# Patient Record
Sex: MALE | Race: WHITE
[De-identification: names, ages, dates, MRNs, and addresses within clinical notes are randomized per-mention and may not be internally consistent; named-entity substitution may affect disease eponyms.]

---

## 2018-10-12 ENCOUNTER — HOSPITAL ENCOUNTER (EMERGENCY)
Dept: HOSPITAL 97 - ER | Age: 83
Discharge: HOME | End: 2018-10-12
Payer: COMMERCIAL

## 2018-10-12 VITALS — OXYGEN SATURATION: 94 % | DIASTOLIC BLOOD PRESSURE: 80 MMHG | SYSTOLIC BLOOD PRESSURE: 118 MMHG

## 2018-10-12 VITALS — TEMPERATURE: 99.1 F

## 2018-10-12 DIAGNOSIS — Z79.82: ICD-10-CM

## 2018-10-12 DIAGNOSIS — I10: ICD-10-CM

## 2018-10-12 DIAGNOSIS — E78.5: ICD-10-CM

## 2018-10-12 DIAGNOSIS — Z88.8: ICD-10-CM

## 2018-10-12 DIAGNOSIS — Z85.46: ICD-10-CM

## 2018-10-12 DIAGNOSIS — I25.10: ICD-10-CM

## 2018-10-12 DIAGNOSIS — R05: Primary | ICD-10-CM

## 2018-10-12 LAB
ALBUMIN SERPL BCP-MCNC: 3.3 G/DL (ref 3.4–5)
ALP SERPL-CCNC: 55 U/L (ref 45–117)
ALT SERPL W P-5'-P-CCNC: 25 U/L (ref 12–78)
AST SERPL W P-5'-P-CCNC: 24 U/L (ref 15–37)
BUN BLD-MCNC: 26 MG/DL (ref 7–18)
GLUCOSE SERPLBLD-MCNC: 130 MG/DL (ref 74–106)
HCT VFR BLD CALC: 44.8 % (ref 39.6–49)
LYMPHOCYTES # SPEC AUTO: 0.7 K/UL (ref 0.7–4.9)
MCH RBC QN AUTO: 33.8 PG (ref 27–35)
MCV RBC: 96.4 FL (ref 80–100)
PMV BLD: 8.7 FL (ref 7.6–11.3)
POTASSIUM SERPL-SCNC: 3.9 MMOL/L (ref 3.5–5.1)
RBC # BLD: 4.65 M/UL (ref 4.33–5.43)
UA COMPLETE W REFLEX CULTURE PNL UR: (no result)

## 2018-10-12 PROCEDURE — 80048 BASIC METABOLIC PNL TOTAL CA: CPT

## 2018-10-12 PROCEDURE — 93005 ELECTROCARDIOGRAM TRACING: CPT

## 2018-10-12 PROCEDURE — 87040 BLOOD CULTURE FOR BACTERIA: CPT

## 2018-10-12 PROCEDURE — 80076 HEPATIC FUNCTION PANEL: CPT

## 2018-10-12 PROCEDURE — 87804 INFLUENZA ASSAY W/OPTIC: CPT

## 2018-10-12 PROCEDURE — 85025 COMPLETE CBC W/AUTO DIFF WBC: CPT

## 2018-10-12 PROCEDURE — 81003 URINALYSIS AUTO W/O SCOPE: CPT

## 2018-10-12 PROCEDURE — 71045 X-RAY EXAM CHEST 1 VIEW: CPT

## 2018-10-12 PROCEDURE — 84145 PROCALCITONIN (PCT): CPT

## 2018-10-12 PROCEDURE — 81015 MICROSCOPIC EXAM OF URINE: CPT

## 2018-10-12 PROCEDURE — 36415 COLL VENOUS BLD VENIPUNCTURE: CPT

## 2018-10-12 PROCEDURE — 99284 EMERGENCY DEPT VISIT MOD MDM: CPT

## 2018-10-12 PROCEDURE — 96365 THER/PROPH/DIAG IV INF INIT: CPT

## 2018-10-12 NOTE — EKG
Test Date:    2018-10-12               Test Time:    01:17:06

Technician:   CHANDRA                                    

                                                     

MEASUREMENT RESULTS:                                       

Intervals:                                           

Rate:         70                                     

AR:           208                                    

QRSD:         98                                     

QT:           378                                    

QTc:          408                                    

Axis:                                                

P:            65                                     

AR:           208                                    

QRS:          -64                                    

T:            64                                     

                                                     

INTERPRETIVE STATEMENTS:                                       

                                                     

Sinus rhythm with marked sinus arrhythmia

Left axis deviation

Abnormal ECG

Compared to ECG 03/08/2017 14:21:18

Sinus bradycardia no longer present



Electronically Signed On 10-12-18 08:11:01 CDT by Victor Manuel Mancia

## 2018-10-12 NOTE — EDPHYS
Physician Documentation                                                                           

 [facility]                                                                

Name: Ap Orr                                                                                   

Age: 83 yrs                                                                                       

Sex: Male                                                                                         

: 1934                                                                                   

MRN: [id number]                                                                                   

Arrival Date: 10/12/2018                                                                          

Time: 00:50                                                                                       

Account#: [id number]                                                                            

Bed 14                                                                                            

Private MD:                                                                                       

ED Physician Preston Martinez                                                                      

HPI:                                                                                              

10/12                                                                                             

01:01 This 83 yrs old  Male presents to ER via EMS with complaints of chills.        cp  

01:01 The patient reports fever, not measured (subjective). Onset: The symptoms/episode       cp  

      began/occurred tonight. Associated signs and symptoms: Pertinent positives: chills,         

      cough, that is dry. Severity of symptoms: in the emergency department the symptoms are      

      unchanged.                                                                                  

                                                                                                  

Historical:                                                                                       

- Allergies:                                                                                      

00:53 ambien (agitation);                                                                     jb4 

- Home Meds:                                                                                      

00:53 aspirin 81 mg Oral TbEC [Active]; Lipitor 40 mg Oral tab [Active]; Lipitor Oral         jb4 

      [Active]; Sotalol 40 mg Oral daily [Active]; Zyrtec 10 mg Oral tab once daily [Active];     

- PMHx:                                                                                           

00:53 CAD; Hyperlipidemia; Hypertension; Prostate Cancer;                                     jb4 

- PSHx:                                                                                           

00:53 Appendectomy; Cholecystectomy; Heart Surgery;                                           jb4 

                                                                                                  

- Immunization history:: Adult Immunizations up to date, Flu vaccine is up to date.               

- Social history:: Smoking status: Patient/guardian denies using tobacco.                         

- Ebola Screening: : No symptoms or risks identified at this time.                                

                                                                                                  

                                                                                                  

ROS:                                                                                              

01:02 Eyes: Negative for injury, pain, redness, and discharge.                                cp  

01:02 Constitutional: Positive for chills, Negative for fever, poor PO intake.                    

01:02 ENT: Negative for drainage from ear(s), ear pain, rhinorrhea, sore throat, difficulty       

      swallowing, difficulty handling secretions.                                                 

01:02 Cardiovascular: Negative for chest pain, edema, palpitations.                               

01:02 Respiratory: Positive for cough, Negative for shortness of breath, wheezing.                

01:02 Abdomen/GI: Negative for abdominal pain, nausea, vomiting, and diarrhea.                    

01:02 Back: Negative for pain at rest, pain with movement, radiated pain.                         

01:02 Skin: Negative for rash.                                                                    

01:02 Neuro: Negative for altered mental status, headache, weakness.                              

01:02 All other systems are negative.                                                             

                                                                                                  

Exam:                                                                                             

01:05 Constitutional: The patient appears in no acute distress, alert, awake,                 cp  

      non-diaphoretic, non-toxic, well developed, well nourished.                                 

01:05 Head/Face:  Normocephalic, atraumatic.                                                  cp  

01:05 Eyes: Periorbital structures: appear normal, Pupils: equal, round, and reactive to          

      light and accomodation, Extraocular movements: intact throughout, Conjunctiva: normal,      

      no exudate, no injection, Sclera: no appreciated abnormality, Lids and lashes: appear       

      normal, bilaterally.                                                                        

01:05 ENT: External ear(s): are unremarkable, Ear canal(s): are normal, clear, TM's: bulging,     

      is not appreciated, bilaterally, dullness, bilaterally, erythema, is not appreciated,       

      bilaterally, Nose: is normal, Mouth: Lips: dry, Oral mucosa: moist, Posterior pharynx:      

      is normal, airway is patent, no erythema, no exudate, Voice: is normal.                     

01:05 Neck: ROM/movement: is normal, is supple, without pain, no range of motions                 

      limitations, no meningismus, no nuchal rigidity.                                            

01:05 Chest/axilla: Inspection: normal, Palpation: is normal, no crepitus, no tenderness.         

01:05 Cardiovascular: Rate: normal, Rhythm: regular, Pulses: Pulses are 2+ in right radial        

      artery and left radial artery. Edema: is not appreciated, JVD: is not appreciated.          

01:05 Respiratory: the patient does not display signs of respiratory distress,  Respirations:     

      normal, no use of accessory muscles, no retractions, no splinting, no tachypnea,            

      labored breathing, is not present, Breath sounds: are clear throughout, no decreased        

      breath sounds, no stridor, no wheezing.                                                     

01:05 Abdomen/GI: Inspection: abdomen appears normal, Bowel sounds: active, all quadrants,        

      Palpation: abdomen is soft and non-tender, in all quadrants.                                

01:05 Back: pain, is absent, ROM is normal.                                                       

01:05 Skin: cellulitis, is not appreciated, no rash present.                                      

01:05 Neuro: Orientation: to person, place \T\ time. Mentation: lucid, able to follow commands,   

      Cerebellar function: Romberg testing is negative, normal finger to nose testing, Motor:     

      moves all fours, strength is normal, Sensation: no obvious gross deficits.                  

01:18 ECG was reviewed by the Attending Physician.                                              

                                                                                                  

Vital Signs:                                                                                      

00:53  / 100; Pulse 81; Resp 18; Temp 99.1; Pulse Ox 97% ; Weight 101.15 kg; Height 5   jb4 

      ft. 11 in. (180.34 cm); Pain 0/10;                                                          

01:30  / 76; Pulse 86; Resp 18; Pulse Ox 94% on R/A;                                    jb4 

02:35  / 72; Pulse 85; Resp 16; Pulse Ox 96% ;                                          ds4 

03:00  / 80; Pulse 81; Resp 18; Pulse Ox 94% on R/A;                                    jb4 

00:53 Body Mass Index 31.10 (101.15 kg, 180.34 cm)                                            jb4 

                                                                                                  

MDM:                                                                                              

00:53 Patient medically screened.                                                             cp  

01:30 Differential diagnosis: viral Infection, bacterial infection, URI, bronchitis,          cp  

      pneumonia UTI, meningitis, sepsis.                                                          

02:55 Data reviewed: vital signs, nurses notes, lab test result(s), radiologic studies, plain cp  

      films.                                                                                      

02:55 Test interpretation: by ED physician or midlevel provider: plain radiologic studies.    cp  

      Counseling: I had a detailed discussion with the patient and/or guardian regarding: the     

      historical points, exam findings, and any diagnostic results supporting the                 

      discharge/admit diagnosis, lab results, radiology results, to return to the emergency       

      department if symptoms worsen or persist or if there are any questions or concerns that     

      arise at home. Response to treatment: the patient's symptoms have markedly improved         

      after treatment, VSS. Patient reports he is feeling better, and as a result, I will         

      discharge patient.                                                                          

                                                                                                  

10/12                                                                                             

01:06 Order name: Influenza Screen (a \T\ B); Complete Time: 01:53                              

10/12                                                                                             

01:53 Interpretation: Reviewed.                                                                 

10/12                                                                                             

01:06 Order name: Blood Culture Adult (2)                                                       

10/12                                                                                             

01:06 Order name: Procalcitonin; Complete Time: 02:49                                           

10/12                                                                                             

01:06 Order name: CBC with Diff; Complete Time: 02:04                                           

10/12                                                                                             

02:05 Interpretation: ; DIMA% 80.6; LYM% 11.4.                                            

10/12                                                                                             

01:06 Order name: BMP; Complete Time: 02:16                                                     

10/12                                                                                             

02:16 Interpretation: Normal except: ; GLUC 130; BUN 26; CRE 2.00; GFR 32; CA 8.2.        

10/12                                                                                             

01:06 Order name: LFT's; Complete Time: 02:16                                                   

10/12                                                                                             

02:17 Interpretation: Normal except: BILIT 2.6; BILID 0.4; ALB 3.3; A/G 0.9.                    

10/12                                                                                             

01:06 Order name: IV; Complete Time: 01:42                                                      

10/12                                                                                             

01:06 Order name: XRAY Chest (1 view)                                                           

10/12                                                                                             

01:06 Order name: Urine Dipstick-Ancillary (obtain specimen); Complete Time: 02:16              

10/12                                                                                             

01:06 Order name: Urine Microscopic Only                                                        

10/12                                                                                             

01:07 Order name: EKG; Complete Time: 01:07                                                     

10/12                                                                                             

02:17 Order name: Urine Dipstick--Ancillary (enter results)                                   [facility] 

10/12                                                                                             

01:07 Order name: EKG - Nurse/Tech; Complete Time: 01:42                                      cp  

                                                                                                  

EC:18 Rate is 70 beats/min. Rhythm is regular. UT interval is prolonged at 208 msec. QRS      cp  

      interval is normal. QT interval is normal. Interpreted by me. Reviewed by me.               

                                                                                                  

Administered Medications:                                                                         

02:20 Drug: NS 0.9% 250 ml Route: IV; Rate: bolus; Site: right antecubital;                   jb4 

02:45 Follow up: Response: No adverse reaction; IV Status: Completed infusion                 jb4 

03:13 Follow up: IV Status: Completed infusion                                                ak1 

02:45 Drug: NS 0.9% 250 ml Route: IV; Rate: bolus; Site: right antecubital;                   jb4 

03:10 Follow up: Response: No adverse reaction; IV Status: Completed infusion                 jb4 

                                                                                                  

                                                                                                  

Disposition:                                                                                      

09:40 Co-signature as Attending Physician, Preston Martinez MD I agree with the assessment and  [facility] 

      plan of care.                                                                               

                                                                                                  

Disposition:                                                                                      

10/12/18 02:56 Discharged to Home. Impression: Chills (without fever), Cough.                     

- Condition is Stable.                                                                            

- Discharge Instructions: Cool Mist Vaporizer, Cough, Adult.                                      

                                                                                                  

- Medication Reconciliation Form, Thank You Letter, Antibiotic Education, Prescription            

  Opioid Use form.                                                                                

- Follow up: Private Physician; When: 2 - 3 days; Reason: Recheck today's complaints.             

- Problem is new.                                                                                 

- Symptoms have improved.                                                                         

                                                                                                  

                                                                                                  

                                                                                                  

Signatures:                                                                                       

Dispatcher [employer]                           Preston Arguelles MD MD cha Page, Corey, PA                         PA   cp                                                   

Ike Cardenas, RN                       RN   jb4                                                  

Medina Manning                           RN   ak1                                                  

                                                                                                  

Corrections: (The following items were deleted from the chart)                                    

02:16 02:16 Normal except: ; GLUC 130; BUN 26; CRE 2.00; GFR 32. cp                     cp  

03:22 02:56 10/12/2018 02:56 Discharged to Home. Impression: Chills (without fever); Cough.   jb4 

      Condition is Stable. Forms are Medication Reconciliation Form, Thank You Letter,            

      Antibiotic Education, Prescription Opioid Use. Follow up: Private Physician; When: 2 -      

      3 days; Reason: Recheck today's complaints. Problem is new. Symptoms have improved. cp      

                                                                                                  

**************************************************************************************************

## 2018-10-12 NOTE — RAD REPORT
EXAM DESCRIPTION:  RAD - Chest Single View - 10/12/2018 1:27 am

 

CLINICAL HISTORY:  Cough, fever, chills

 

COMPARISON:  March 2017

 

TECHNIQUE:  AP portable chest image was obtained 0120 hours .

 

FINDINGS:  No focal mass, consolidation or failure finding. Patient has a mild baseline prominence of
 the interstitial markings. Slight nodularity at the left hilum is stable from March 2017. Heart and 
vasculature are normal. No measurable pleural effusion and no pneumothorax. No acute bony abnormality
 seen. No acute aortic findings suspected.

 

IMPRESSION:  No acute cardiopulmonary process.

 

Chest findings are similar to comparison.

## 2018-10-12 NOTE — XMS REPORT
Clinical Summary

 Created on:2018



Patient:Ap Orr

Sex:Male

:1934

External Reference #:[id number]





Demographics







 Address  [address] [place] [address]

 

 Home Phone  [phone]

 

 Preferred Language  English

 

 Marital Status  Unknown

 

 Worship Affiliation  Unknown

 

 Race  White

 

 Ethnic Group  Not  or 









Author







 Organization  [facility]

 

 Address  [address] [place] [address]

 

 Phone  [phone]









Support







 Name  Relationship  Address  Phone

 

 Unavailable  Unavailable  [address]  [phone]



     [place] [address]  

 

 Unavailable  Unavailable  Unavailable  [phone]









Care Team Providers







 Name  Role  Phone

 

 Unavailable  Primary Care Provider  Unavailable









Allergies







 Active Allergy  Reactions  Severity  Noted Date  Comments

 

 Zolpidem    High  2017  







Current Medications







 Prescription  Sig.  Disp.  Refills  Start Date  End Date  Status

 

 atorvastatin (LIPITOR)  Take 40 mg by          Active



 40 MG tablet  mouth daily.          

 

 sotalol AF (BETAPACE  Take 40 mg by          Active



 AF) 80 MG tablet  mouth daily.          

 

 cetirizine (ZYRTEC) 10  Take 10 mg by          Active



 MG tablet  mouth daily.          

 

 ascorbic acid, vitamin  Take 500 mg by          Active



 C, (ASCORBIC ACID WITH  mouth daily.          



 WLIDA HIPS) 500 MG            



 tablet            

 

 rivaroxaban (XARELTO)  Take 1 tablet (20  30 tablet  2  03/10/2017    Active



 20 mg Tab tablet  mg total) by          



   mouth daily with          



   dinner.          







Active Problems







 Problem  Noted Date

 

 Stroke (HCC)  2017







Social History







 Tobacco Use  Types  Packs/Day  Years Used  Date

 

 Never Smoker        









 Sex Assigned at Birth  Date Recorded

 

 Not on file  







Last Filed Vital Signs

Not on file



Plan of Treatment

Not on file



Results

Not on fileafter 10/11/2017

## 2018-10-12 NOTE — ER
Nurse's Notes                                                                                     

 [facility]                                                                

Name: Ap Orr                                                                                   

Age: 83 yrs                                                                                       

Sex: Male                                                                                         

: 1934                                                                                   

MRN: [id number]                                                                                   

Arrival Date: 10/12/2018                                                                          

Time: 00:50                                                                                       

Account#: [id number]                                                                            

Bed 14                                                                                            

Private MD:                                                                                       

Diagnosis: Chills (without fever);Cough                                                           

                                                                                                  

Presentation:                                                                                     

10/12                                                                                             

00:50 Presenting complaint: EMS states: Pt was found sitting in his home complaining of fever jb4 

      and chills. Transition of care: patient was not received from another setting of care.      

      Onset of symptoms was 2018. Risk Assessment: Do you want to hurt yourself       

      or someone else? Patient reports no desire to harm self or others. Initial Sepsis           

      Screen: Does the patient meet any 2 criteria? No. Patient's initial sepsis screen is        

      negative. Does the patient have a suspected source of infection? No. Patient's initial      

      sepsis screen is negative. Care prior to arrival: None.                                     

00:50 Method Of Arrival: EMS: [facility]                                                jb4 

00:50 Acuity: ELIZABETH 4                                                                           jb4 

                                                                                                  

Triage Assessment:                                                                                

00:53 General: Appears in no apparent distress. uncomfortable, Behavior is calm, cooperative, jb4 

      appropriate for age. Pain: Denies pain. EENT: No signs and/or symptoms were reported        

      regarding the EENT system. Neuro: Level of Consciousness is awake, alert, obeys             

      commands, Oriented to person, place, time, situation. Cardiovascular: Heart tones S1 S2     

      present Patient's skin is warm and dry. Respiratory: Airway is patent Respiratory           

      effort is even, unlabored, Respiratory pattern is regular, symmetrical, Breath sounds       

      are clear bilaterally. GI: Abdomen is round non-distended, Bowel sounds present X 4         

      quads. Abd is soft and non tender X 4 quads. : No signs and/or symptoms were reported     

      regarding the genitourinary system. Derm: Skin is intact, Skin is pink, warm \T\ dry.       

      Musculoskeletal: Circulation, motion, and sensation intact.                                 

                                                                                                  

Historical:                                                                                       

- Allergies:                                                                                      

00:53 ambien (agitation);                                                                     jb4 

- Home Meds:                                                                                      

00:53 aspirin 81 mg Oral TbEC [Active]; Lipitor 40 mg Oral tab [Active]; Lipitor Oral         jb4 

      [Active]; Sotalol 40 mg Oral daily [Active]; Zyrtec 10 mg Oral tab once daily [Active];     

- PMHx:                                                                                           

00:53 CAD; Hyperlipidemia; Hypertension; Prostate Cancer;                                     jb4 

- PSHx:                                                                                           

00:53 Appendectomy; Cholecystectomy; Heart Surgery;                                           jb4 

                                                                                                  

- Immunization history:: Adult Immunizations up to date, Flu vaccine is up to date.               

- Social history:: Smoking status: Patient/guardian denies using tobacco.                         

- Ebola Screening: : No symptoms or risks identified at this time.                                

                                                                                                  

                                                                                                  

Screenin:58 Abuse screen: Denies threats or abuse. Nutritional screening: No deficits noted.        jb4 

      Tuberculosis screening: No symptoms or risk factors identified. Fall Risk None              

      identified.                                                                                 

                                                                                                  

Assessment:                                                                                       

00:58 General: see triage assessment .                                                        jb4 

02:00 Reassessment: Patient appears in no apparent distress at this time. Patient and/or      jb4 

      family updated on plan of care and expected duration. Pain level reassessed. Patient is     

      alert, oriented x 3, equal unlabored respirations, skin warm/dry/pink.                      

03:20 Reassessment: Patient appears in no apparent distress at this time. Patient and/or      jb4 

      family updated on plan of care and expected duration. Pain level reassessed. Patient is     

      alert, oriented x 3, equal unlabored respirations, skin warm/dry/pink. discussed D/c,       

      F/u with pt, Denies questions or concerns.                                                  

                                                                                                  

Vital Signs:                                                                                      

00:53  / 100; Pulse 81; Resp 18; Temp 99.1; Pulse Ox 97% ; Weight 101.15 kg; Height 5   jb4 

      ft. 11 in. (180.34 cm); Pain 0/10;                                                          

01:30  / 76; Pulse 86; Resp 18; Pulse Ox 94% on R/A;                                    jb4 

02:35  / 72; Pulse 85; Resp 16; Pulse Ox 96% ;                                          ds4 

03:00  / 80; Pulse 81; Resp 18; Pulse Ox 94% on R/A;                                    jb4 

00:53 Body Mass Index 31.10 (101.15 kg, 180.34 cm)                                            jb4 

                                                                                                  

ED Course:                                                                                        

00:50 Patient arrived in ED.                                                                  jb4 

00:51 Triage completed.                                                                       jb4 

00:53 Preston Gonzalez PA is PHCP.                                                                cp  

00:53 Preston Martinez MD is Attending Physician.                                             cp  

00:53 Arm band placed on left wrist.                                                          jb4 

00:58 Ike Cardenas, RN is Primary Nurse.                                                     jb4 

00:58 Patient has correct armband on for positive identification. Placed in gown. Bed in low  jb4 

      position. Call light in reach. Side rails up X 1. Pulse ox on. NIBP on.                     

01:23 X-ray completed. Portable x-ray completed in exam room. Patient tolerated procedure     ag1 

      well.                                                                                       

01:28 XRAY Chest (1 view) In Process Unspecified.                                             EDMS

01:49 Influenza Screen (a \T\ B) Sent.                                                          ds4

01:49 LFT's Sent.                                                                             ds4 

01:49 Blood Culture Adult (2) Sent.                                                           ds4 

01:49 BMP Sent.                                                                               ds4 

01:49 Procalcitonin Sent.                                                                     ds4 

01:49 CBC with Diff Sent.                                                                     ds4 

01:49 Inserted saline lock: 20 gauge in right antecubital area, using aseptic technique.      ds4 

      Blood collected.                                                                            

02:39 Blood Culture Adult (2) Sent.                                                           ds4 

02:39 Procalcitonin Sent.                                                                     ds4 

03:20 IV discontinued, intact, bleeding controlled.                                           jb4 

03:20 No provider procedures requiring assistance completed.                                  jb4 

                                                                                                  

Administered Medications:                                                                         

02:20 Drug: NS 0.9% 250 ml Route: IV; Rate: bolus; Site: right antecubital;                   jb4 

02:45 Follow up: Response: No adverse reaction; IV Status: Completed infusion                 jb4 

03:13 Follow up: IV Status: Completed infusion                                                ak1 

02:45 Drug: NS 0.9% 250 ml Route: IV; Rate: bolus; Site: right antecubital;                   jb4 

03:10 Follow up: Response: No adverse reaction; IV Status: Completed infusion                 jb4 

                                                                                                  

                                                                                                  

Outcome:                                                                                          

02:56 Discharge ordered by MD.                                                                memo  

03:20 Discharged to home ambulatory.                                                          jb4 

03:20 Condition: stable                                                                           

03:20 Discharge instructions given to patient, Instructed on discharge instructions, follow       

      up and referral plans. Demonstrated understanding of instructions, follow-up care.          

03:22 Patient left the ED.                                                                    jb4 

                                                                                                  

Signatures:                                                                                       

Dispatcher [employer]                           Gigi Antoine                             ds4                                                  

Medina Manning, RN                       RN   ak1                                                  

Lisseth Interiano1                                                  

Preston Gonzalez PA PA cp Bryson, James, RN                       RN   jb4                                                  

                                                                                                  

**************************************************************************************************

## 2018-10-12 NOTE — XMS REPORT
Patient Summary Document

 Created on:2018



Patient:MARIANNE GRULLON

Sex:Male

:1934

External Reference #:[id number]





Demographics







 Address  [address] [place] [address]

 

 Home Phone  [phone]

 

 Preferred Language  Unknown

 

 Marital Status  Unknown

 

 Gnosticism Affiliation  Unknown

 

 Race  Unknown

 

 Additional Race(s)  Unavailable

 

 Ethnic Group  Unknown









Author







 Organization  [facility]

 

 Address  [address]



   [place] [address]

 

 Phone  [phone]









Care Team Providers







 Name  Role  Phone

 

 NATI WARD  Unavailable  Unavailable









Problems

This patient has no known problems.



Allergies, Adverse Reactions, Alerts

This patient has no known allergies or adverse reactions.



Medications

This patient has no known medications.



Results







 Test Description  Test Time  Test Comments  Text Results  Atomic Results  
Result Comments









 POCT-GLUCOSE METER  2017-03-10 17:06:00    









   Test Item  Value  Reference Range  Comments









 POC-GLUCOSE METER (BEAKER) (test  86 mg/dL    TESTED AT [facility] [address] 
[facility]



 pztn=[id number])      [facility] [address]



POCT-GLUCOSE METER2017-03-10 12:26:00





 Test Item  Value  Reference Range  Comments

 

 POC-GLUCOSE METER (BEAKER)  86 mg/dL    TESTED AT [address]



 (test wtsu=1530)      [facility] [address]



CBC W/PLT COUNT &amp; AUTO DIFFERENTIAL2017-03-10 08:00:00





 Test Item  Value  Reference Range  Comments

 

 WHITE BLOOD CELL COUNT (BEAKER) (test code=775)  9.4 K/ L  4.0-10.0  

 

 RED BLOOD CELL COUNT (BEAKER) (test code=761)  4.32 M/ L  4.20-5.80  

 

 HEMOGLOBIN (BEAKER) (test code=410)  15.1 GM/DL  13.0-16.8  

 

 HEMATOCRIT (BEAKER) (test code=411)  43.3 %  40.0-50.0  

 

 MEAN CORPUSCULAR VOLUME (BEAKER) (test code=753)  100.0 fL  82.0-98.0  

 

 MEAN CORPUSCULAR HEMOGLOBIN (BEAKER) (test  34.8 pg  27.0-33.0  



 code=751)      

 

 MEAN CORPUSCULAR HEMOGLOBIN CONC (BEAKER) (test  34.7 GM/DL  32.0-36.0  



 code=752)      

 

 RED CELL DISTRIBUTION WIDTH (BEAKER) (test  12.0 %  10.3-14.2  



 code=412)      

 

 PLATELET COUNT (BEAKER) (test code=756)  138 K/CU MM  150-430  

 

 MEAN PLATELET VOLUME (BEAKER) (test code=754)  8.3 fL  6.5-10.5  

 

 NUCLEATED RED BLOOD CELLS (BEAKER) (test  0 /100 WBC  0-0  



 code=413)      

 

 NEUTROPHILS RELATIVE PERCENT (BEAKER) (test  58 %    



 code=429)      

 

 LYMPHOCYTES RELATIVE PERCENT (BEAKER) (test  31 %    



 code=430)      

 

 MONOCYTES RELATIVE PERCENT (BEAKER) (test  8 %    



 code=431)      

 

 EOSINOPHILS RELATIVE PERCENT (BEAKER) (test  2 %    



 code=432)      

 

 BASOPHILS RELATIVE PERCENT (BEAKER) (test  1 %    



 code=437)      

 

 NEUTROPHILS ABSOLUTE COUNT (BEAKER) (test  5.44 K/ L  1.80-8.00  



 code=670)      

 

 LYMPHOCYTES ABSOLUTE COUNT (BEAKER) (test  2.94 K/ L  1.48-4.50  



 code=414)      

 

 MONOCYTES ABSOLUTE COUNT (BEAKER) (test  0.73 K/ L  0.00-1.30  



 code=415)      

 

 EOSINOPHILS ABSOLUTE COUNT (BEAKER) (test  0.22 K/ L  0.00-0.50  



 code=416)      

 

 BASOPHILS ABSOLUTE COUNT (BEAKER) (test  0.06 K/ L  0.00-0.20  



 code=417)      



0.00POCT-GLUCOSE [id number] 21:27:00





 Test Item  Value  Reference Range  Comments

 

 POC-GLUCOSE METER (BEAKER)  84 mg/dL    TESTED AT [address]



 (test ajff=0953)      [place] [address]



[id number] 14:57:00





 Test Item  Value  Reference Range  Comments

 

 RPR SCREEN (BEAKER) (test code=420)  Nonreactive  Nonreactive  



POCT-GLUCOSE [id number] 12:20:00





 Test Item  Value  Reference Range  Comments

 

 POC-GLUCOSE METER (BEAKER)  141 mg/dL    TESTED AT [address]



 (test zycg=3870)      [place] [address]



POCT-GLUCOSE [id number] 08:06:00





 Test Item  Value  Reference Range  Comments

 

 POC-GLUCOSE METER (BEAKER)  86 mg/dL    TESTED AT [address]



 (test ncur=1004)      [place] [address]



CBC W/PLT COUNT &amp; AUTO [id number] 05:34:00





 Test Item  Value  Reference Range  Comments

 

 WHITE BLOOD CELL COUNT (BEAKER) (test code=775)  6.7 K/ L  4.0-10.0  

 

 RED BLOOD CELL COUNT (BEAKER) (test code=761)  3.94 M/ L  4.20-5.80  

 

 HEMOGLOBIN (BEAKER) (test code=410)  14.0 GM/DL  13.0-16.8  

 

 HEMATOCRIT (BEAKER) (test code=411)  39.2 %  40.0-50.0  

 

 MEAN CORPUSCULAR VOLUME (BEAKER) (test code=753)  99.6 fL  82.0-98.0  

 

 MEAN CORPUSCULAR HEMOGLOBIN (BEAKER) (test  35.5 pg  27.0-33.0  



 code=751)      

 

 MEAN CORPUSCULAR HEMOGLOBIN CONC (BEAKER) (test  35.6 GM/DL  32.0-36.0  



 code=752)      

 

 RED CELL DISTRIBUTION WIDTH (BEAKER) (test  11.8 %  10.3-14.2  



 code=412)      

 

 PLATELET COUNT (BEAKER) (test code=756)  129 K/CU MM  150-430  

 

 MEAN PLATELET VOLUME (BEAKER) (test code=754)  7.9 fL  6.5-10.5  

 

 NUCLEATED RED BLOOD CELLS (BEAKER) (test  0 /100 WBC  0-0  



 code=413)      

 

 NEUTROPHILS RELATIVE PERCENT (BEAKER) (test  49 %    



 code=429)      

 

 LYMPHOCYTES RELATIVE PERCENT (BEAKER) (test  38 %    



 code=430)      

 

 MONOCYTES RELATIVE PERCENT (BEAKER) (test  8 %    



 code=431)      

 

 EOSINOPHILS RELATIVE PERCENT (BEAKER) (test  4 %    



 code=432)      

 

 BASOPHILS RELATIVE PERCENT (BEAKER) (test  1 %    



 code=437)      

 

 NEUTROPHILS ABSOLUTE COUNT (BEAKER) (test  3.24 K/ L  1.80-8.00  



 code=670)      

 

 LYMPHOCYTES ABSOLUTE COUNT (BEAKER) (test  2.55 K/ L  1.48-4.50  



 code=414)      

 

 MONOCYTES ABSOLUTE COUNT (BEAKER) (test  0.53 K/ L  0.00-1.30  



 code=415)      

 

 EOSINOPHILS ABSOLUTE COUNT (BEAKER) (test  0.27 K/ L  0.00-0.50  



 code=416)      

 

 BASOPHILS ABSOLUTE COUNT (BEAKER) (test  0.07 K/ L  0.00-0.20  



 code=417)      



0.00BASIC METABOLIC [id number] 05:17:00





 Test Item  Value  Reference Range  Comments

 

 SODIUM (BEAKER) (test  143 meq/L  136-145  



 kwag=526)      

 

 POTASSIUM (BEAKER) (test  3.6 meq/L  3.5-5.1  



 code=379)      

 

 CHLORIDE (BEAKER) (test  116 meq/L    



 code=382)      

 

 CO2 (BEAKER) (test  22 meq/L  22-29  



 code=355)      

 

 BLOOD UREA NITROGEN  20 mg/dL  7-21  



 (BEAKER) (test code=354)      

 

 CREATININE (BEAKER) (test  1.10 mg/dL  0.57-1.25  



 code=358)      

 

 GLUCOSE RANDOM (BEAKER)  88 mg/dL    



 (test code=652)      

 

 CALCIUM (BEAKER) (test  7.3 mg/dL  8.4-10.2  



 code=697)      

 

 EGFR (BEAKER) (test  64 mL/min/1.73 sq m    ESTIMATED GFR IS NOT AS



 code=1092)      ACCURATE AS CREATININE



       CLEARANCE IN PREDICTING



       GLOMERULAR FILTRATION



       RATE. ESTIMATED GFR IS



       NOT APPLICABLE FOR



       DIALYSIS PATIENTS.



FastingSpecimen slightly [id number] 05:13:00





 Test Item  Value  Reference Range  Comments

 

 PHOSPHORUS (BEAKER) (test code=604)  3.0 mg/dL  2.3-4.7  



[id number] 05:13:00





 Test Item  Value  Reference Range  Comments

 

 MAGNESIUM (BEAKER) (test code=627)  1.6 mg/dL  1.6-2.6  



FastingLIPID [id number] 05:13:00





 Test Item  Value  Reference Range  Comments

 

 TRIGLYCERIDES (BEAKER) (test code=540)  78 mg/dL    

 

 CHOLESTEROL (BEAKER) (test code=631)  80 mg/dL    

 

 HDL CHOLESTEROL (BEAKER) (test code=976)  21 mg/dL    

 

 LDL CHOLESTEROL CALCULATED (BEAKER) (test code=633)  43 mg/dL    



Triglyceride Reference Range:   Low Risk         &lt;150   Borderline    150-
199   High Risk     200-499   Very High Risk  &gt;=500Cholesterol Reference 
Range:   Low Risk         &lt;200   Borderline 200-239    High Risk        &gt;
240HDL Cholesterol Reference Range:   Low Risk         &gt;=60   High Risk     
    &lt;40LDL Cholesterol Reference Range:   Optimal          &lt;100   Near 
Optimal  100-129   Borderline    130-159   High          160-189   Very High   
    &gt;=190   FastingSpecimen slightly ictericPOCT-GLUCOSE [id number] 22:
21:00





 Test Item  Value  Reference Range  Comments

 

 POC-GLUCOSE METER (BEAKER)  91 mg/dL    TESTED AT [facility] [address]



 (test ycyg=5742)      [facility] [address]



HEMOGLOBIN [id number] 21:17:00





 Test Item  Value  Reference Range  Comments

 

 HEMOGLOBIN A1C (BEAKER) (test code=368)  5.0 %  4.3-6.1  



VITAMIN B12 AND [id number] 21:00:00





 Test Item  Value  Reference Range  Comments

 

 VITAMIN B12 (BEAKER) (test code=774)  433 pg/mL  213-816  

 

 FOLATE (BEAKER) (test code=362)  > ng/mL  >=7.0  



Effective 2014: Folate Reference Range ChangeNew: &gt;=7.0    Previous: &
gt;=5.4PROTHROMBIN TIME/[id number] 19:14:00





 Test Item  Value  Reference Range  Comments

 

 PROTIME (BEAKER) (test code=759)  14.2 seconds  11.7-14.7  

 

 INR (BEAKER) (test code=370)  1.1  <=5.9  



RECOMMENDED COUMADIN/WARFARIN INR THERAPY RANGESSTANDARD DOSE: 2.0 - 3.0   
Includes: PROPHYLAXIS forvenous thrombosis, systemic embolization; TREATMENT 
for venous thrombosis and/or pulmonary embolus.HIGH RISK: Target INR is 2.5-3.5 
for patients with mechanical heart valves.[id number] 19:14:00





 Test Item  Value  Reference Range  Comments

 

 PARTIAL THROMBOPLASTIN TIME (BEAKER) (test  36.0 seconds  22.5-36.0  



 code=760)      



BASIC METABOLIC [id number] 19:09:00





 Test Item  Value  Reference Range  Comments

 

 SODIUM (BEAKER) (test  140 meq/L  136-145  



 ubqe=724)      

 

 POTASSIUM (BEAKER) (test  4.2 meq/L  3.5-5.1  



 code=379)      

 

 CHLORIDE (BEAKER) (test  109 meq/L    



 code=382)      

 

 CO2 (BEAKER) (test  21 meq/L  22-29  



 code=355)      

 

 BLOOD UREA NITROGEN  21 mg/dL  7-21  



 (BEAKER) (test code=354)      

 

 CREATININE (BEAKER) (test  1.41 mg/dL  0.57-1.25  



 code=358)      

 

 GLUCOSE RANDOM (BEAKER)  90 mg/dL    



 (test code=652)      

 

 CALCIUM (BEAKER) (test  9.1 mg/dL  8.4-10.2  



 code=697)      

 

 EGFR (BEAKER) (test  48 mL/min/1.73 sq m    ESTIMATED GFR IS NOT AS



 code=1092)      ACCURATE AS CREATININE



       CLEARANCE IN PREDICTING



       GLOMERULAR FILTRATION



       RATE. ESTIMATED GFR IS



       NOT APPLICABLE FOR



       DIALYSIS PATIENTS.



Specimen slightly ictericCBC W/PLT COUNT &amp; AUTO [id number] 19:00
:00





 Test Item  Value  Reference Range  Comments

 

 WHITE BLOOD CELL COUNT (BEAKER) (test code=775)  9.5 K/ L  4.0-10.0  

 

 RED BLOOD CELL COUNT (BEAKER) (test code=761)  4.91 M/ L  4.20-5.80  

 

 HEMOGLOBIN (BEAKER) (test code=410)  16.3 GM/DL  13.0-16.8  

 

 HEMATOCRIT (BEAKER) (test code=411)  47.7 %  40.0-50.0  

 

 MEAN CORPUSCULAR VOLUME (BEAKER) (test code=753)  97.2 fL  82.0-98.0  

 

 MEAN CORPUSCULAR HEMOGLOBIN (BEAKER) (test  33.2 pg  27.0-33.0  



 code=751)      

 

 MEAN CORPUSCULAR HEMOGLOBIN CONC (BEAKER) (test  34.2 GM/DL  32.0-36.0  



 code=752)      

 

 RED CELL DISTRIBUTION WIDTH (BEAKER) (test  13.1 %  10.3-14.2  



 code=412)      

 

 PLATELET COUNT (BEAKER) (test code=756)  148 K/CU MM  150-430  

 

 MEAN PLATELET VOLUME (BEAKER) (test code=754)  7.7 fL  6.5-10.5  

 

 NUCLEATED RED BLOOD CELLS (BEAKER) (test  0 /100 WBC  0-0  



 code=413)      

 

 NEUTROPHILS RELATIVE PERCENT (BEAKER) (test  54 %    



 code=429)      

 

 LYMPHOCYTES RELATIVE PERCENT (BEAKER) (test  35 %    



 code=430)      

 

 MONOCYTES RELATIVE PERCENT (BEAKER) (test  7 %    



 code=431)      

 

 EOSINOPHILS RELATIVE PERCENT (BEAKER) (test  3 %    



 code=432)      

 

 BASOPHILS RELATIVE PERCENT (BEAKER) (test  1 %    



 code=437)      

 

 NEUTROPHILS ABSOLUTE COUNT (BEAKER) (test  5.11 K/ L  1.80-8.00  



 code=670)      

 

 LYMPHOCYTES ABSOLUTE COUNT (BEAKER) (test  3.35 K/ L  1.48-4.50  



 code=414)      

 

 MONOCYTES ABSOLUTE COUNT (BEAKER) (test  0.66 K/ L  0.00-1.30  



 code=415)      

 

 EOSINOPHILS ABSOLUTE COUNT (BEAKER) (test  0.32 K/ L  0.00-0.50  



 code=416)      

 

 BASOPHILS ABSOLUTE COUNT (BEAKER) (test  0.08 K/ L  0.00-0.20  



 code=417)      



0.00

## 2020-09-25 ENCOUNTER — HOSPITAL ENCOUNTER (OUTPATIENT)
Dept: HOSPITAL 97 - OR | Age: 85
Discharge: HOME | End: 2020-09-25
Attending: OTOLARYNGOLOGY
Payer: COMMERCIAL

## 2020-09-25 VITALS — DIASTOLIC BLOOD PRESSURE: 76 MMHG | TEMPERATURE: 97.9 F | SYSTOLIC BLOOD PRESSURE: 118 MMHG

## 2020-09-25 VITALS — OXYGEN SATURATION: 97 %

## 2020-09-25 DIAGNOSIS — Z20.828: ICD-10-CM

## 2020-09-25 DIAGNOSIS — C44.319: Primary | ICD-10-CM

## 2020-09-25 DIAGNOSIS — Z88.8: ICD-10-CM

## 2020-09-25 DIAGNOSIS — I10: ICD-10-CM

## 2020-09-25 DIAGNOSIS — I48.91: ICD-10-CM

## 2020-09-25 PROCEDURE — 88305 TISSUE EXAM BY PATHOLOGIST: CPT

## 2020-09-25 PROCEDURE — 88331 PATH CONSLTJ SURG 1 BLK 1SPC: CPT

## 2020-09-25 PROCEDURE — 11643 EXC F/E/E/N/L MAL+MRG 2.1-3: CPT

## 2020-09-25 PROCEDURE — 11644 EXC F/E/E/N/L MAL+MRG 3.1-4: CPT

## 2020-09-25 PROCEDURE — 0JB10ZZ EXCISION OF FACE SUBCUTANEOUS TISSUE AND FASCIA, OPEN APPROACH: ICD-10-PCS

## 2020-09-25 PROCEDURE — 93005 ELECTROCARDIOGRAM TRACING: CPT

## 2020-09-25 PROCEDURE — 88332 PATH CONSLTJ SURG EA ADD BLK: CPT

## 2020-09-25 NOTE — XMS REPORT
Continuity of Care Document

                          Created on:2020



Patient:MARIANNE GRULLON

Sex:Male

:1934

External Reference #:[id number]





Demographics







                          Address                   [address]



                                                    [place] [address]

 

                          Home Phone                [phone]

 

                          Preferred Language        English

 

                          Marital Status            Unknown

 

                          Mandaen Affiliation     Unknown

 

                          Race                      Unknown

 

                          Additional Race(s)        Unavailable



                                                    White

 

                          Ethnic Group              Unknown









Author







                          Organization              [facility]

t

 

                          Address                   [address]



                                                    [place] [address]

 

                          Phone                     [phone]









Support







                Name            Relationship    Address         Phone

 

                Kirby             Spouse          [address] [facility] [address] [facility] [address]    [phone]



                                                [place] [address] 

 

                Kris          Daughter        Unavailable     [phone]









Care Team Providers







                    Name                Role                Phone

 

                    Sharpjayce           Primary Care Physician [phone]

 

                    LAZARIDENISE           Attending Clinician Unavailable

 

                    LAZARIDIS           Admitting Clinician Unavailable









Problems







       Condition Condition Condition Status Onset  Resolution Last   Treating Co

mments 

Source



       Name   Details Category        Date   Date   Treatment Clinician        



                                                 Date                 

 

       Stroke Stroke Disease Active                              [facility]



                                   3-08                               [facility]



                                   00:[facility]:                             [facility]



                                   00                                 [place]







Allergies, Adverse Reactions, Alerts







       Allergy Allergy Status Severity Reaction(s) Onset  Inactive Treating Comm

ents 

Source



       Name   Type                        Date   Date   Clinician        

 

       Zolpidem Drug   Active Severe                              [facility]



              Allergy                      3-08                        [facility]



                                          [facility]:                      [facility]



                                          00                          [place]







Social History







           Social Habit Start Date Stop Date  Quantity   Comments   Source

 

           Sex Assigned At Birth                                             [facility]









                Smoking Status  Start Date      Stop Date       Source

 

                Never smoker                                    [facility]







Medications







       Ordered Filled Start  Stop   Current Ordering Indication Dosage Frequency

 Signature

                    Comments            Components          Source



     Medication Medication Date Date Medication? Clinician                (SIG) 

          



     Name Name                                                   

 

     rivaroxaban            Yes            20mg      Take 1           [facility]



     (XARELTO)      3-10                               tablet (20           [facility]

s -



     20 mg Tab      00:00:                               mg total)           Med

ical



     tablet      00                                 by mouth           Center



                                                  daily with           



                                                  dinner.           

 

     atorvastati            Yes            40mg QD   Take 40 mg           

[facility]



     n (LIPITOR)      3-09                               by mouth           [facility]

s -



     40 MG      01:31:                               daily.           Medical



     tablet      21                                                [place]

 

     sotalol AF            Yes            40mg QD   Take 40 mg           [facility]

[facility]



     (BETAPACE      3-09                               by mouth           [facility] 

-



     AF) 80 MG      01:31:                               daily.           [facility]

l



     tablet      21                                                [place]

 

     cetirizine            Yes            10mg QD   Take 10 mg           [facility]

[facility]



     (ZYRTEC) 10      3-09                               by mouth           [facility]

s -



     MG tablet      01:31:                               daily.           [facility]

l



               21                                                [place]

 

     ascorbic            Yes            500mg QD   Take 500           [facility]



     acid,      3-09                               mg by           [facility]



     vitamin C,      01:31:                               mouth           [facility]

l



     (ASCORBIC      21                                 daily.           Center



     ACID WITH                                                        



     WILDA HIPS)                                                        



     500 MG                                                        



     tablet                                                        







Procedures

This patient has no known procedures.



Results







           Test Description Test Time  Test Comments Results    Result Comments 

Source









                    POCT-GLUCOSE METER  2017-03-10 17:06:00 









                      Test Item  Value      Reference Range Interpretation Comme

\[facility] [place] [facility]\""









             POC-GLUCOSE METER (BEAKER) (test 86 mg/dL                    

     TESTED AT [facility] [address]



             code = 1538)                                        [facility] [address]

0



POCT-GLUCOSE METER2017-03-10 12:26:00





             Test Item    Value        Reference Range Interpretation Comments

 

             POC-GLUCOSE METER 86 mg/dL                         TESTED AT 

[facility] [address]



             ([facility]) (test code =                                        [address] [facility] [address]



             1538)                                               



CBC W/PLT COUNT &amp; AUTO DIFFERENTIAL2017-03-10 08:00:00





             Test Item    Value        Reference Range Interpretation Comments

 

             WHITE BLOOD CELL COUNT (BEAKER) 9.4 K/ L     4.0-10.0              

    



             (test code = 775)                                        

 

             RED BLOOD CELL COUNT (BEAKER) 4.32 M/ L    4.20-5.80               

  



             (test code = 761)                                        

 

             HEMOGLOBIN (BEAKER) (test code = 15.1 GM/DL   13.0-16.8            

     



             410)                                                

 

             HEMATOCRIT (BEAKER) (test code = 43.3 %       40.0-50.0            

     



             411)                                                

 

             MEAN CORPUSCULAR VOLUME (BEAKER) 100.0 fL     82.0-98.0    H       

     



             (test code = 753)                                        

 

             MEAN CORPUSCULAR HEMOGLOBIN 34.8 pg      27.0-33.0    H            



             (BEAKER) (test code = 751)                                        

 

             MEAN CORPUSCULAR HEMOGLOBIN CONC 34.7 GM/DL   32.0-36.0            

     



             (BEAKER) (test code = 752)                                        

 

             RED CELL DISTRIBUTION WIDTH 12.0 %       10.3-14.2                 



             (BEAKER) (test code = 412)                                        

 

             PLATELET COUNT (BEAKER) (test 138 K/CU MM  150-430      L          

  



             code = 756)                                         

 

             MEAN PLATELET VOLUME (BEAKER) 8.3 fL       6.5-10.5                

  



             (test code = 754)                                        

 

             NUCLEATED RED BLOOD CELLS 0 /100 WBC   0-0                       



             (BEAKER) (test code = 413)                                        

 

             NEUTROPHILS RELATIVE PERCENT 58 %                                  

 



             (BEAKER) (test code = 429)                                        

 

             LYMPHOCYTES RELATIVE PERCENT 31 %                                  

 



             (BEAKER) (test code = 430)                                        

 

             MONOCYTES RELATIVE PERCENT 8 %                                    



             (BEAKER) (test code = 431)                                        

 

             EOSINOPHILS RELATIVE PERCENT 2 %                                   

 



             (BEAKER) (test code = 432)                                        

 

             BASOPHILS RELATIVE PERCENT 1 %                                    



             (BEAKER) (test code = 437)                                        

 

             NEUTROPHILS ABSOLUTE COUNT 5.44 K/ L    1.80-8.00                 



             (BEAKER) (test code = 670)                                        

 

             LYMPHOCYTES ABSOLUTE COUNT 2.94 K/ L    1.48-4.50                 



             (BEAKER) (test code = 414)                                        

 

             MONOCYTES ABSOLUTE COUNT (BEAKER) 0.73 K/ L    0.00-1.30           

      



             (test code = 415)                                        

 

             EOSINOPHILS ABSOLUTE COUNT 0.22 K/ L    0.00-0.50                 



             (BEAKER) (test code = 416)                                        

 

             BASOPHILS ABSOLUTE COUNT (BEAKER) 0.06 K/ L    0.00-0.20           

      



             (test code = 417)                                        



0.00POCT-GLUCOSE [id number] 21:27:00





             Test Item    Value        Reference Range Interpretation Comments

 

             POC-GLUCOSE METER 84 mg/dL                         TESTED AT 

[place] [address]



             ([facility]) (test code =                                        [facility] [address]



             1538)                                               



[id number] 14:57:00





             Test Item    Value        Reference Range Interpretation Comments

 

             RPR SCREEN ([facility]) (test code = Nonreactive  Nonreactive          

     



             420)                                                



POCT-GLUCOSE [id number] 12:20:00





             Test Item    Value        Reference Range Interpretation Comments

 

             POC-GLUCOSE METER 141 mg/dL           H            TESTED AT 

[place] [address]



             ([facility]) (test code =                                        [facility]



             1538)                                               [address]



POCT-GLUCOSE [id number] 08:06:00





             Test Item    Value        Reference Range Interpretation Comments

 

             POC-GLUCOSE METER 86 mg/dL                         TESTED AT 

[place] [address]



             ([facility]) (test code =                                        [facility] [address]



             1538)                                               



CBC W/PLT COUNT &amp; AUTO [id number] 05:34:00





             Test Item    Value        Reference Range Interpretation Comments

 

             WHITE BLOOD CELL COUNT (BEAKER) 6.7 K/ L     4.0-10.0              

    



             (test code = 775)                                        

 

             RED BLOOD CELL COUNT (BEAKER) 3.94 M/ L    4.20-5.80    L          

  



             (test code = 761)                                        

 

             HEMOGLOBIN (BEAKER) (test code = 14.0 GM/DL   13.0-16.8            

     



             410)                                                

 

             HEMATOCRIT (BEAKER) (test code = 39.2 %       40.0-50.0    L       

     



             411)                                                

 

             MEAN CORPUSCULAR VOLUME (BEAKER) 99.6 fL      82.0-98.0    H       

     



             (test code = 753)                                        

 

             MEAN CORPUSCULAR HEMOGLOBIN 35.5 pg      27.0-33.0    H            



             (BEAKER) (test code = 751)                                        

 

             MEAN CORPUSCULAR HEMOGLOBIN CONC 35.6 GM/DL   32.0-36.0            

     



             (BEAKER) (test code = 752)                                        

 

             RED CELL DISTRIBUTION WIDTH 11.8 %       10.3-14.2                 



             (BEAKER) (test code = 412)                                        

 

             PLATELET COUNT (BEAKER) (test 129 K/CU MM  150-430      L          

  



             code = 756)                                         

 

             MEAN PLATELET VOLUME (BEAKER) 7.9 fL       6.5-10.5                

  



             (test code = 754)                                        

 

             NUCLEATED RED BLOOD CELLS 0 /100 WBC   0-0                       



             (BEAKER) (test code = 413)                                        

 

             NEUTROPHILS RELATIVE PERCENT 49 %                                  

 



             (BEAKER) (test code = 429)                                        

 

             LYMPHOCYTES RELATIVE PERCENT 38 %                                  

 



             (BEAKER) (test code = 430)                                        

 

             MONOCYTES RELATIVE PERCENT 8 %                                    



             (BEAKER) (test code = 431)                                        

 

             EOSINOPHILS RELATIVE PERCENT 4 %                                   

 



             (BEAKER) (test code = 432)                                        

 

             BASOPHILS RELATIVE PERCENT 1 %                                    



             (BEAKER) (test code = 437)                                        

 

             NEUTROPHILS ABSOLUTE COUNT 3.24 K/ L    1.80-8.00                 



             (BEAKER) (test code = 670)                                        

 

             LYMPHOCYTES ABSOLUTE COUNT 2.55 K/ L    1.48-4.50                 



             (BEAKER) (test code = 414)                                        

 

             MONOCYTES ABSOLUTE COUNT (BEAKER) 0.53 K/ L    0.00-1.30           

      



             (test code = 415)                                        

 

             EOSINOPHILS ABSOLUTE COUNT 0.27 K/ L    0.00-0.50                 



             (BEAKER) (test code = 416)                                        

 

             BASOPHILS ABSOLUTE COUNT (BEAKER) 0.07 K/ L    0.00-0.20           

      



             (test code = 417)                                        



0.00BASI METABOLIC [id number] 05:17:00





             Test Item    Value        Reference Range Interpretation Comments

 

             SODIUM (BEAKER) 143 meq/L    136-145                   



             (test code = 381)                                        

 

             POTASSIUM (BEAKER) 3.6 meq/L    3.5-5.1                   



             (test code = 379)                                        

 

             CHLORIDE (BEAKER) 116 meq/L           H            



             (test code = 382)                                        

 

             CO2 (BEAKER) (test 22 meq/L     22-29                     



             code = 355)                                         

 

             BLOOD UREA NITROGEN 20 mg/dL     7-21                      



             (BEAKER) (test code                                        



             = 354)                                              

 

             CREATININE (BEAKER) 1.10 mg/dL   0.57-1.25                 



             (test code = 358)                                        

 

             GLUCOSE RANDOM 88 mg/dL                         



             (BEAKER) (test code                                        



             = 652)                                              

 

             CALCIUM (BEAKER) 7.3 mg/dL    8.4-10.2     L            



             (test code = 697)                                        

 

             EGFR (BEAKER) (test 64 mL/min/1.73                           ESTIMA

GLEN GFR IS



             code = 1092) sq m                                   NOT AS ACCURATE

 AS



                                                                 CREATININE



                                                                 CLEARANCE IN



                                                                 PREDICTING



                                                                 GLOMERULAR



                                                                 FILTRATION RATE

.



                                                                 ESTIMATED GFR I

S



                                                                 NOT APPLICABLE 

FOR



                                                                 DIALYSIS PATIEN

TS.



FastingSpecimen slightly [id number] 05:13:00





             Test Item    Value        Reference Range Interpretation Comments

 

             PHOSPHORUS (BEAKER) (test code = 3.0 mg/dL    2.3-4.7              

     



             604)                                                



[id number] 05:13:00





             Test Item    Value        Reference Range Interpretation Comments

 

             MAGNESIUM (BEAKER) (test code = 1.6 mg/dL    1.6-2.6               

    



             627)                                                



FastingLIPID [id number] 05:13:00





             Test Item    Value        Reference Range Interpretation Comments

 

             TRIGLYCERIDES (BEAKER) (test code = 78 mg/dL                       

        



             540)                                                

 

             CHOLESTEROL (BEAKER) (test code = 80 mg/dL                         

      



             631)                                                

 

             HDL CHOLESTEROL (BEAKER) (test code 21 mg/dL                       

        



             = 976)                                              

 

             LDL CHOLESTEROL CALCULATED (BEAKER) 43 mg/dL                       

        



             (test code = 633)                                        



Triglyceride Reference Range:   Low Risk         &lt;150   Borderline    150-199
  High Risk     200-499   Very High Risk  &gt;=500Cholesterol Reference Range:  
Low Risk         &lt;200   Borderline 200-239    High Risk        &gt;240HDL 
Cholesterol Reference Range:   Low Risk         &gt;=60   High Risk         
&lt;40LDL Cholesterol Reference Range:   Optimal          &lt;100   Near Optimal
 100-129   Borderline    130-159   High          160-189   Very High       
&gt;=190   FastingSpecimen slightly ictericPOCT-GLUCOSE [id number] 22:21:00





             Test Item    Value        Reference Range Interpretation Comments

 

             POC-GLUCOSE METER 91 mg/dL                         TESTED AT 

[facility] [address]



             (BEAKER) (test code =                                        [place] [address]



             1538)                                               



HEMOGLOBIN [id number] 21:17:00





             Test Item    Value        Reference Range Interpretation Comments

 

             HEMOGLOBIN A1C (BEAKER) (test code = 5.0 %        4.3-6.1          

         



             368)                                                



VITAMIN B12 AND [id number] 21:00:00





             Test Item    Value        Reference Range Interpretation Comments

 

             VITAMIN B12 (BEAKER) (test code = 433 pg/mL    213-816             

      



             774)                                                

 

             FOLATE (BEAKER) (test code = 362) > ng/mL      >=7.0               

      



Effective 2014: Folate Reference Range ChangeNew: &gt;=7.0    Previous: 
&gt;=5.4PROTHROMBIN [id number] 19:14:00





             Test Item    Value        Reference Range Interpretation Comments

 

             PROTIME (BEAKER) (test code = 14.2 seconds 11.7-14.7               

  



             759)                                                

 

             INR (BEAKER) (test code = 370) 1.1          <=5.9                  

   



RECOMMENDED COUMADIN/WARFARIN INR THERAPY RANGESSTANDARD DOSE: 2.0 - 3.0   
Includes: PROPHYLAXIS forvenous thrombosis, systemic embolization; TREATMENT for
venous thrombosis and/or pulmonary embolus.HIGH RISK: Target INR is 2.5-3.5 for 
patients with mechanical heart valves.[id number] 19:14:00





             Test Item    Value        Reference Range Interpretation Comments

 

             PARTIAL THROMBOPLASTIN TIME 36.0 seconds 22.5-36.0                 



             (BEAKER) (test code = 760)                                        



BASIC METABOLIC [id number] 19:09:00





             Test Item    Value        Reference Range Interpretation Comments

 

             SODIUM (BEAKER) 140 meq/L    136-145                   



             (test code = 381)                                        

 

             POTASSIUM (BEAKER) 4.2 meq/L    3.5-5.1                   



             (test code = 379)                                        

 

             CHLORIDE (BEAKER) 109 meq/L           H            



             (test code = 382)                                        

 

             CO2 (BEAKER) (test 21 meq/L     22-29        L            



             code = 355)                                         

 

             BLOOD UREA NITROGEN 21 mg/dL     7-21                      



             (BEAKER) (test code                                        



             = 354)                                              

 

             CREATININE (BEAKER) 1.41 mg/dL   0.57-1.25    H            



             (test code = 358)                                        

 

             GLUCOSE RANDOM 90 mg/dL                         



             (BEAKER) (test code                                        



             = 652)                                              

 

             CALCIUM (BEAKER) 9.1 mg/dL    8.4-10.2                  



             (test code = 697)                                        

 

             EGFR (BEAKER) (test 48 mL/min/1.73                           ESTIMA

GLEN GFR IS



             code = 1092) sq m                                   NOT AS ACCURATE

 AS



                                                                 CREATININE



                                                                 CLEARANCE IN



                                                                 PREDICTING



                                                                 GLOMERULAR



                                                                 FILTRATION RATE

.



                                                                 ESTIMATED GFR I

S



                                                                 NOT APPLICABLE 

FOR



                                                                 DIALYSIS PATIEN

TS.



Specimen slightly ictericCBC W/PLT COUNT &amp; AUTO [id number] 
19:00:00





             Test Item    Value        Reference Range Interpretation Comments

 

             WHITE BLOOD CELL COUNT (BEAKER) 9.5 K/ L     4.0-10.0              

    



             (test code = 775)                                        

 

             RED BLOOD CELL COUNT (BEAKER) 4.91 M/ L    4.20-5.80               

  



             (test code = 761)                                        

 

             HEMOGLOBIN (BEAKER) (test code = 16.3 GM/DL   13.0-16.8            

     



             410)                                                

 

             HEMATOCRIT (BEAKER) (test code = 47.7 %       40.0-50.0            

     



             411)                                                

 

             MEAN CORPUSCULAR VOLUME (BEAKER) 97.2 fL      82.0-98.0            

     



             (test code = 753)                                        

 

             MEAN CORPUSCULAR HEMOGLOBIN 33.2 pg      27.0-33.0    H            



             (BEAKER) (test code = 751)                                        

 

             MEAN CORPUSCULAR HEMOGLOBIN CONC 34.2 GM/DL   32.0-36.0            

     



             (BEAKER) (test code = 752)                                        

 

             RED CELL DISTRIBUTION WIDTH 13.1 %       10.3-14.2                 



             (BEAKER) (test code = 412)                                        

 

             PLATELET COUNT (BEAKER) (test 148 K/CU MM  150-430      L          

  



             code = 756)                                         

 

             MEAN PLATELET VOLUME (BEAKER) 7.7 fL       6.5-10.5                

  



             (test code = 754)                                        

 

             NUCLEATED RED BLOOD CELLS 0 /100 WBC   0-0                       



             (BEAKER) (test code = 413)                                        

 

             NEUTROPHILS RELATIVE PERCENT 54 %                                  

 



             (BEAKER) (test code = 429)                                        

 

             LYMPHOCYTES RELATIVE PERCENT 35 %                                  

 



             (BEAKER) (test code = 430)                                        

 

             MONOCYTES RELATIVE PERCENT 7 %                                    



             (BEAKER) (test code = 431)                                        

 

             EOSINOPHILS RELATIVE PERCENT 3 %                                   

 



             (BEAKER) (test code = 432)                                        

 

             BASOPHILS RELATIVE PERCENT 1 %                                    



             (BEAKER) (test code = 437)                                        

 

             NEUTROPHILS ABSOLUTE COUNT 5.11 K/ L    1.80-8.00                 



             (BEAKER) (test code = 670)                                        

 

             LYMPHOCYTES ABSOLUTE COUNT 3.35 K/ L    1.48-4.50                 



             (BEAKER) (test code = 414)                                        

 

             MONOCYTES ABSOLUTE COUNT (BEAKER) 0.66 K/ L    0.00-1.30           

      



             (test code = 415)                                        

 

             EOSINOPHILS ABSOLUTE COUNT 0.32 K/ L    0.00-0.50                 



             (BEAKER) (test code = 416)                                        

 

             BASOPHILS ABSOLUTE COUNT (BEAKER) 0.08 K/ L    0.00-0.20           

      



             (test code = 417)                                        



0.00

## 2020-09-25 NOTE — P.BOP
Preoperative diagnosis: neoplasm uncertain behavior


Postoperative diagnosis: BCC cheek and chin


Primary procedure: WLE with FS


Secondary procedure: layered closure


Assistant: NONE,NONE


Estimated blood loss: <5ml


Specimen: R cheek, chin, new deep margin R cheek


Findings: negative peripheral margins, inflammation vs focal+ of R cheek deep 

margin


Anesthesia: General


Complications: None


Implants: none


Transferred to: Other (Day Surgery)


Condition: Good

## 2020-09-25 NOTE — OP
Date of Procedure:  09/25/2020



Surgeon:  Starr Olivia MD



Preoperative Diagnosis:  Neoplasm of uncertain behavior of the chin and right lateral cheek with hist
ory of prior skin cancers.



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
ral cheek.



Procedure:  

1.Excision of malignant skin cancer of the chin, total diameter 1.5 cm requiring intermediate closur
e of length less than 2.5 cm. 

2.Excision of malignant skin lesion of the right cheek, maximum diameter 3 cm with layered closure o
f intermediate complexity of total length 4 cm.



Indication For Procedure:  Mr. Ap Orr presented with concerning appearing lesion of the right giovanni
k and chin due to insurance restrictions on frozen section with the local hospital facility from  o
ffice and use of long-term anticoagulants, the decision was made to perform procedure 

in the operating room under monitored anesthesia care.



Anesthesia:  Local anesthetic infiltration and mild to moderate sedation.



Specimens:  

1.Chin suture marks 12 o'clock.

2.Right cheek suture marks 12 o'clock, both for frozen section analysis.

3.Right cheek new deep margin true margin marked with ink for permanent section.



Description Of Procedure:  After adequate placement of monitors and sedation, the chin and cheek were
 cleaned with alcohol and the areas around the suspicious lesions were injected with 1% lidocaine wit
h epinephrine.  A total of 4.5 mL was used.  The patient's face was prepped with Betadine and draped 
in sterile fashion leaving the nose and mouth uncovered to allow for egress of oxygen which was deliv
ered intermittently through nasal cannula.  The chin lesion was addressed first.  A 15-blade scalpel 
was used to make an incision full-thickness through the skin with a 5 mm margin circumferentially poncho
und the gross tumor.  The specimen was elevated sharply from the subcutaneous fat layer.  A suture wa
s placed at the superior most aspect of this lesion to indicate 12 o'clock and was sent to the pathol
ogist for frozen section analysis.  After confirming cessation of nasal cannula oxygen delivery and e
vacuating the field from the excess oxygen using the suction, the Bovie electrocautery was used to pr
ovide hemostasis along the cut skin edges and at the base of the wound.  In addition the surrounding 
skin was elevated to allow for later closure.  The total size of the defect was circular 1.5 cm.  An 
additional 1-1.5 cm of mobilization and undermining was performed.  The area was covered with a moist
 cloth and attention was turned to the right cheek.  



The right cheek lesion was noted and mildly crusted.  A 5 mm margin was designed circumferentially ar
ound the lesion.  The 15-blade scalpel was used to incise through the full-thickness of skin to the l
evel of the adipose tissue in a fusiform shape oriented vertically.  The specimen was elevated sharpl
y off the underlying subcutaneous fatty tissue.  A suture was placed at the superior most aspect and 
sent to pathology for frozen section analysis.  After confirming cessation of oxygen delivery, the Mariano
vie electrocautery was used to provide hemostasis along the skin edges and at the base of the wound. 
 The subcutaneous tissue was then widely undermined in order to provide closure.  The total size of t
he defect prior to undermining was 2 x 3.0 cm.  Undermining was performed approximately 1 to 1.5 cm a
nterior and posterior to the site to aid in closure.  Due to the degree of skin laxity of the lateral
 cheek, the wound was closed in a layered fashion using 4-0 Vicryl deep sutures and 5-0 plain gut run
seth sutures for the skin.  Direct pressure was applied to aid in hemostasis. 



In consultation with the pathologist, the chin lesion was confirmed as a basal cell carcinoma with ne
gative peripheral and deep margins and plan for closure of the defect was made.  Intermediate layered
 closure was deemed appropriate.  Deep sutures were applied to approximate the [place] into a horizont
al line.  A small Burow's triangles were excised from the lateral aspects of the wound resulting in i
mproved cosmesis of the incision.  The skin was then closed in a running fashion with 5-0 plain gut s
utures 

In consultation with the pathologist, the peripheral margins of the right cheek were negative for bas
al cell carcinoma, but the deep margin wise somewhat concerning with areas of inflammatory cells with
 difficulty in full assessment and clearance.  In consultation with the pathologist, we opted for re-
excision of the deep margin to be sent for permanent due to the nature of the tissue.  The fatty tiss
ue would be very difficult to cut on a frozen section and would likely result in less than optimal an
alysis.  The previously placed sutures along the right cheek were removed.  The deep margin was re-ex
cised using Bovie electrocautery.  The true margin was marked with surgical ink and sent to Pathology
 in formalin as a permanent specimen.  The wound was then reclosed with Vicryl and plain gut sutures.
  The patient's skin was cleaned and dried and triple antibiotic ointment was applied to both the roger
ek and chin incisions.  The patient was then returned to day surgery for recovery and discharge.



Complications:  None.



Disposition:  The patient will be discharged home later today and follow up with Dr. Olivia in  da
 for assessment of wound healing.





RYLAN

DD:  09/25/2020 10:28:55Voice ID:  [id number]

DT:  09/25/2020 11:26:28Report ID:  [id number]

## 2020-09-25 NOTE — XMS REPORT
Clinical Summary

                          Created on:2020



Patient:Ap Orr

Sex:Male

:1934

External Reference #:[id number]





Demographics







                          [address]                   [address]



                                                    [place] [address]

 

                          Home Phone                [phone]

 

                          Preferred Language        English

 

                          Marital Status            Unknown

 

                          Orthodox Affiliation     Unknown

 

                          Race                      White

 

                          Ethnic Group              Not  or 









Author







                          Organization              [facility]

 

                          Address                   [address]



                                                    [place] [address]









Support







                Name            Relationship    Address         Phone

 

                Micah Orr      Unavailable     [address]    [phone]



                                                [place] [address] 

 

                Loli Ponce    Unavailable     Unavailable     [phone]









Care Team Providers







                    Name                Role                Phone

 

                    Sharpjayce           Primary Care Provider [phone]









Allergies







             Active Allergy Reactions    Severity     Noted Date   Comments

 

             Zolpidem                  High         2017   







Medications







          Medication Sig       Dispensed Refills   Start Date End Date  Status

 

          atorvastatin (LIPITOR) Take 40 mg by           0                      

       Active



          40 MG tablet mouth daily.                                         

 

          sotalol AF (BETAPACE Take 40 mg by           0                        

     Active



          AF) 80 MG tablet mouth daily.                                         

 

          cetirizine (ZYRTEC) 10 Take 10 mg by           0                      

       Active



          MG tablet mouth daily.                                         

 

          ascorbic acid, vitamin Take 500 mg by           0                     

        Active



          C, (ASCORBIC ACID WITH mouth daily.                                   

      



          WILDA HIPS) 500 MG                                                   



          tablet                                                      

 

          rivaroxaban (XARELTO) Take 1 tablet (20 30 tablet 2         03/10/2017

           Active



          20 mg Tab tablet mg total) by                                         



                    mouth daily with                                         



                    dinner.                                           







Active Problems







                          Problem                   Noted Date

 

                          Stroke                    2017







Social History







             Tobacco Use  Types        Packs/Day    Years Used   Date

 

             Never Smoker                                        









                          Sex Assigned at Birth     Date Recorded

 

                          Not on file               









                    Job Start Date      Occupation          Industry

 

                    Not on file         Not on file         Not on file









                    Travel History      Travel Start        Travel End









                                        No recent travel history available.







Last Filed Vital Signs

Not on file



Plan of Treatment

Not on file



Results

Not on fileafter 2019



Insurance







           Payer      Benefit Plan / Subscriber ID Type       Phone      Address



                      Group                                       

 

           [employer] - MEDICARE [employer] MED[employer]ARE HMO xxxxxxxx              [phone]

[id number]







           [employer]   [employer] PPO                                     [place]



                                                                  [id number]









           Guarantor Name Account Type Relation to Date of Birth Phone      Bill

ing



                                 Patient                          Address

 

           Ap Orr Personal/Family Self       1934 [phone] [address] [place] [address])     [place] [address]







Advance Directives

For more information, please contact:[address] [phone]





                Code Status     Date Activated  Date Inactivated Comments

 

                DNR             3/9/2017  8:18 AM 3/10/2017  8:34 PM

## 2022-09-22 ENCOUNTER — HOSPITAL ENCOUNTER (INPATIENT)
Dept: HOSPITAL 97 - 4TH | Age: 87
LOS: 2 days | Discharge: HOME | DRG: 203 | End: 2022-09-24
Attending: INTERNAL MEDICINE | Admitting: INTERNAL MEDICINE
Payer: COMMERCIAL

## 2022-09-22 VITALS — OXYGEN SATURATION: 98 %

## 2022-09-22 VITALS — BODY MASS INDEX: 28 KG/M2

## 2022-09-22 DIAGNOSIS — I12.9: ICD-10-CM

## 2022-09-22 DIAGNOSIS — J45.31: Primary | ICD-10-CM

## 2022-09-22 DIAGNOSIS — Z79.01: ICD-10-CM

## 2022-09-22 DIAGNOSIS — Z90.79: ICD-10-CM

## 2022-09-22 DIAGNOSIS — E78.5: ICD-10-CM

## 2022-09-22 DIAGNOSIS — J30.9: ICD-10-CM

## 2022-09-22 DIAGNOSIS — Z95.1: ICD-10-CM

## 2022-09-22 DIAGNOSIS — Z86.711: ICD-10-CM

## 2022-09-22 DIAGNOSIS — I25.10: ICD-10-CM

## 2022-09-22 DIAGNOSIS — I44.1: ICD-10-CM

## 2022-09-22 DIAGNOSIS — I48.0: ICD-10-CM

## 2022-09-22 DIAGNOSIS — Z86.73: ICD-10-CM

## 2022-09-22 DIAGNOSIS — N18.32: ICD-10-CM

## 2022-09-22 DIAGNOSIS — Z88.8: ICD-10-CM

## 2022-09-22 DIAGNOSIS — Z90.49: ICD-10-CM

## 2022-09-22 DIAGNOSIS — Z79.899: ICD-10-CM

## 2022-09-22 DIAGNOSIS — Z85.46: ICD-10-CM

## 2022-09-22 PROCEDURE — 85025 COMPLETE CBC W/AUTO DIFF WBC: CPT

## 2022-09-22 PROCEDURE — 87811 SARS-COV-2 COVID19 W/OPTIC: CPT

## 2022-09-22 PROCEDURE — 36415 COLL VENOUS BLD VENIPUNCTURE: CPT

## 2022-09-22 PROCEDURE — 93005 ELECTROCARDIOGRAM TRACING: CPT

## 2022-09-22 PROCEDURE — 84443 ASSAY THYROID STIM HORMONE: CPT

## 2022-09-22 PROCEDURE — 83735 ASSAY OF MAGNESIUM: CPT

## 2022-09-22 PROCEDURE — 71045 X-RAY EXAM CHEST 1 VIEW: CPT

## 2022-09-22 PROCEDURE — 80053 COMPREHEN METABOLIC PANEL: CPT

## 2022-09-22 RX ADMIN — GUAIFENESIN AND CODEINE PHOSPHATE PRN ML: 100; 10 SOLUTION ORAL at 22:49

## 2022-09-22 RX ADMIN — IPRATROPIUM BROMIDE SCH MG: 0.5 SOLUTION RESPIRATORY (INHALATION) at 20:45

## 2022-09-22 RX ADMIN — ALBUTEROL SULFATE SCH MG: 2.5 SOLUTION RESPIRATORY (INHALATION) at 20:45

## 2022-09-22 NOTE — XMS REPORT
Continuity of Care Document

                          Created on:2022



Patient:MARIANNE GRULLON

Sex:Male

:1934

External Reference #:[id number]





Demographics







                          Address                   [address]



                                                    [place] [address]

 

                          Home Phone                [phone]

 

                          Email Address             [place]

 

                          Preferred Language        en-US

 

                          Marital Status            Unknown

 

                          Caodaism Affiliation     Unknown

 

                          Race                      Unknown

 

                          Additional Race(s)        Unavailable



                                                    White

 

                          Ethnic Group              Unknown









Author







                          Organization              [facility]

t

 

                          Address                   [address]



                                                    [place] [address]

 

                          Phone                     [phone]









Support







                Name            Relationship    Address         Phone

 

                Micah Grullon     Spouse          [address]    [phone]



                                                [place] [address] 

 

                Loli Ponce   Daughter        Unavailable     [phone]









Care Team Providers







                    Name                Role                Phone

 

                    Sharpjayce           Primary Care Physician [phone]

 

                    Jose Angel Thorpe Attending Clinician [phone]

 

                    NATI WARD Attending Clinician Unavailable

 

                    NATI WARD Admitting Clinician Unavailable









Problems







       Condition Condition Condition Status Onset  Resolution Last   Treating Co

mments 

Source



       Name   Details Category        Date   Date   Treatment Clinician        



                                                 Date                 

 

       Stroke Stroke Disease Active                              [facility]



                                   [facility]                               [facility]



                                   [facility]



                                   [facility]                                 [facility]

 

       History of   History Problem Active               2022             

  [facility]



       - CVA  of - CVA                             04:10:20               l



       (context-d (context-d                                                  He

rmann



       ependent ependent                                                  



       category) category)                                                  



              Active                                                  



              Problem                                                  



              2022                                                  



               [facility]                                                  



              Neuro                                                   

 

       Hyperlipid  Hyperlipi Problem Active               2022            

   [facility]



       emia   demia                              04:10:20               l



       (disorder) (disorder)                                                  He

rmann



               Active                                                  



              Problem                                                  



              2022                                                  



              [facility]                                                  



              Neuro                                                   

 

       Hypertensi  Hypertens Problem Active               2022            

   [facility]



       ve     wang                                04:10:20               l



       disorder, disorder,                                                  Herm

lamin



       systemic systemic                                                  



       arterial arterial                                                  



       (disorder) (disorder)                                                  



              Active                                                  



              Problem                                                  



              2022                                                  



              [facility]                                                  



              Neuro                                                   

 

       Paroxysmal  Paroxysma Problem Active               2022            

   [facility]



       atrial l atrial                             04:10:20               l



       fibrillati fibrillati                                                  He

rmann



       on     on                                                      



       (disorder) (disorder)                                                  



              Active                                                  



              Problem                                                  



              2022                                                  



              [facility]                                                  



              Neuro                                                   

 

       Malignant  Malignant Problem Resolve               2022            

   [facility]



       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



       prostate prostate                                                  Albin

n



       (disorder) (disorder)                                                  



              Resolved                                                  



              Problem                                                  



              2022                                                  



              [facility]                                                  



              Neuro                                                   

 

       Dizziness        Problem Active               2022               Me

moria



       (finding) Dizziness                             04:10:20               l



              (finding)                                                  Gopi



              Active                                                  



              Problem                                                  



              2022                                                  



              [facility]                                                  



              Neuro                                                   







Allergies, Adverse Reactions, Alerts







       Allergy Allergy Status Severity Reaction(s) Onset  Inactive Treating Comm

ents 

Source



       Name   Type                        Date   Date   Clinician        

 

       Zolpidem Drug   Active Severe                              [facility]



              Allergy                      3-08                        [facility]



                                          [facility]                      [facility]



                                                                    [facility]







Social History







           Social Habit Start Date Stop Date  Quantity   Comments   Source

 

           Social History 2022                       [facility]



                      16:30:33   16:30:33                         

 

           Alcohol intake 2017                       [facility]

es



                      00:00:00   00:00:00                         [facility]

 

           Sex Assigned At [birth year]                       [facility]

kes



           [facility]      [facility]00[facility]00   [facility]00                         [facility]









                Smoking Status  Start Date      Stop Date       Source

 

                Never smoker                                    [facility]







Medications







       Ordered Filled Start  Stop   Current Ordering Indication Dosage Frequency

 Signature

                    Comments            Components          Source



     Medication Medication Date Date Medication? Clinician                (SIG) 

          



     Name Name                                                   

 

     sotalol 80            Yes                      0              [facility]



     mg oral      6-09                               Refill(s)           l



     tablet      16:23:                                              Gopi



               00                                                

 

     hydrochloro            Yes                      0              [facility]

a



     thiazide-lo      6-09                               Refill(s)           l



     sartan 12.5      16:23:                                              Albin

n



     mg-50 mg      00                                                



     oral tablet                                                        

 

     latanoprost            Yes                      0              [facility]

a



     ophthalmic      6-09                               Refill(s)           l



     0.005%      16:23:                                              Gopi



     solution      00                                                

 

     Xarelto 20            Yes                      0              [facility]



     mg oral      6-09                               Refill(s)           l



     tablet      16:22:                                              Gopi



               00                                                

 

     atorvastati            Yes                      0              [facility]

a



     n 40 mg      6-09                               Refill(s)           l



     oral tablet      16:22:                                              Albin

n



               00                                                

 

     ascorbic            Yes            500mg QD   Take 500           [facility]



     acid,      3-10                               mg by           [facility]



     vitamin C,      18:34:                               mouth           Medica

l



     (ASCORBIC      01                                 daily.           Center



     ACID WITH                                                        



     WILDA HIPS)                                                        



     500 MG                                                        



     tablet                                                        

 

     atorvastati            Yes            40mg QD   Take 40 mg           

[facility]



     n (LIPITOR)      3-10                               by mouth           [facility]

s



     40 MG      18:34:                               daily.           Medical



     tablet      01                                                Center

 

     sotalol AF            Yes            40mg QD   Take 40 mg           C

[facility]



     (BETAPACE      3-10                               by mouth           [facility]



     AF) 80 MG      18:34:                               daily.           [facility]

l



     tablet      01                                                Center

 

     cetirizine            Yes            10mg QD   Take 10 mg           C

[facility]



     (ZYRTEC) 10      3-10                               by mouth           [facility]

s



     MG tablet      18:34:                               daily.           [facility]

l



               01                                                [place]

 

     rivaroxaban            Yes            20mg      Take 1           [facility]



     (XARELTO)      3-10                               tablet (20           [facility]

s



     20 mg Tab      00:00:                               mg total)           Med

ical



     tablet      00                                 by mouth           Center



                                                  daily with           



                                                  dinner.           







Vital Signs







             Vital Name   Observation Time Observation Value Comments     Source

 

             Systolic (mm Hg) 2022 19:54:00                           Darin

rial Birney

 

             Diastolic (mm Hg) 2022 19:54:00                           [facility]

orial Gopi

 

             Heart Rate   2022 19:54:00                           [facility]

 Gopi

 

             Respitory Rate 2022 19:54:00                           [facility]

al Gopi

 

             Height       2022 19:54:00 172.72 cm                 [facility]

 

             Weight       2022 19:54:00                           [facility]

 Gopi

 

             BMI Calculated 2022 19:54:00                           [facility]

al [place]

 

             Systolic (mm Hg) 2022 16:21:00                           Darin

rial [place]

 

             Diastolic (mm Hg) 2022 16:21:00                           [facility]

orial [facility]

 

             Heart Rate   2022 16:21:00                           [facility]

 Gopi

 

             Respitory Rate 2022 16:21:00                           [facility]

al [place]

 

             Height       2022 16:21:00 173.99 cm                 [facility]

 

             Weight       2022 16:21:00                           [facility]

 

             BMI Calculated 2022 16:21:00                           [facility]

al Gopi







Procedures







                Procedure       Date / Time Performed Performing Clinician [facility]

e

 

                Operation on heart                                 [facility]

lamin







Encounters







        Start   End     Encounter Admission Attending Care    Care    Encounter 

Source



        Date/Time Date/Time Type    Type    Clinicians Facility Department ID   

   

 

        2022 Outpatient                 nullFlavo [facility]     [id number]

[id number] [facility]



        19:45:00 04:59:59                         r       Neurology 01      l



                                                        Ogle         Gopi

 

        2022 Outpatient         Ila  LU[facility] 556

[id number] 



        14:45:00 23:59:59                 Jose Angel                   01      



                                        Kirby                         

 

        2022 Outpatient                 MONA DUNN    [id number]

[id number] [facility]



        14:45:00 14:45:00                                         01      arianna Fongann

 

        2022 2022-06-10 Outpatient                 nullFlavo [facility]     [id number]

[id number] [facility]



        16:30:00 04:59:59                         r       Neurology 00      l



                                                        David         Gopi

 

        2022 [id number]

[id number] 



        11:30:00 23:59:59                 Jose Angel                   00      



                                        Kirby                         

 

        2022 Outpatient                 MONA DUNN    [id number]

[id number] [facility]



        11:30:00 11:30:00                                         00      arianna



                                                                        Gopi







Results







           Test Description Test Time  Test Comments Results    Result Comments 

Source









                    POCT-GLUCOSE METER  2017-03-10 17:06:00 









                      Test Item  Value      Reference Range Interpretation Comme

nts









             POC-GLUCOSE METER (BEAKER) (test 86 mg/dL                    

     TESTED AT [facility] [address]



             code = 1538)                                        [facility] [address]

0



POCT-GLUCOSE METER2017-03-10 12:26:00





             Test Item    Value        Reference Range Interpretation Comments

 

             POC-GLUCOSE METER 86 mg/dL                         TESTED AT 

[facility] [address]



             ([facility]) (test code =                                        [place] [facility] [address]



             1538)                                               



CBC W/PLT COUNT &amp; AUTO DIFFERENTIAL2017-03-10 08:00:00





             Test Item    Value        Reference Range Interpretation Comments

 

             WHITE BLOOD CELL COUNT (BEAKER) 9.4 K/ L     4.0-10.0              

    



             (test code = 775)                                        

 

             RED BLOOD CELL COUNT (BEAKER) 4.32 M/ L    4.20-5.80               

  



             (test code = 761)                                        

 

             HEMOGLOBIN (BEAKER) (test code = 15.1 GM/DL   13.0-16.8            

     



             410)                                                

 

             HEMATOCRIT (BEAKER) (test code = 43.3 %       40.0-50.0            

     



             411)                                                

 

             MEAN CORPUSCULAR VOLUME (BEAKER) 100.0 fL     82.0-98.0    H       

     



             (test code = 753)                                        

 

             MEAN CORPUSCULAR HEMOGLOBIN 34.8 pg      27.0-33.0    H            



             (BEAKER) (test code = 751)                                        

 

             MEAN CORPUSCULAR HEMOGLOBIN CONC 34.7 GM/DL   32.0-36.0            

     



             (BEAKER) (test code = 752)                                        

 

             RED CELL DISTRIBUTION WIDTH 12.0 %       10.3-14.2                 



             (BEAKER) (test code = 412)                                        

 

             PLATELET COUNT (BEAKER) (test 138 K/CU MM  150-430      L          

  



             code = 756)                                         

 

             MEAN PLATELET VOLUME (BEAKER) 8.3 fL       6.5-10.5                

  



             (test code = 754)                                        

 

             NUCLEATED RED BLOOD CELLS 0 /100 WBC   0-0                       



             (BEAKER) (test code = 413)                                        

 

             NEUTROPHILS RELATIVE PERCENT 58 %                                  

 



             (BEAKER) (test code = 429)                                        

 

             LYMPHOCYTES RELATIVE PERCENT 31 %                                  

 



             (BEAKER) (test code = 430)                                        

 

             MONOCYTES RELATIVE PERCENT 8 %                                    



             (BEAKER) (test code = 431)                                        

 

             EOSINOPHILS RELATIVE PERCENT 2 %                                   

 



             (BEAKER) (test code = 432)                                        

 

             BASOPHILS RELATIVE PERCENT 1 %                                    



             (BEAKER) (test code = 437)                                        

 

             NEUTROPHILS ABSOLUTE COUNT 5.44 K/ L    1.80-8.00                 



             (BEAKER) (test code = 670)                                        

 

             LYMPHOCYTES ABSOLUTE COUNT 2.94 K/ L    1.48-4.50                 



             (BEAKER) (test code = 414)                                        

 

             MONOCYTES ABSOLUTE COUNT (BEAKER) 0.73 K/ L    0.00-1.30           

      



             (test code = 415)                                        

 

             EOSINOPHILS ABSOLUTE COUNT 0.22 K/ L    0.00-0.50                 



             (BEAKER) (test code = 416)                                        

 

             BASOPHILS ABSOLUTE COUNT (BEAKER) 0.06 K/ L    0.00-0.20           

      



             (test code = 417)                                        



0.00POCT-GLUCOSE [id number] 21:27:00





             Test Item    Value        Reference Range Interpretation Comments

 

             POC-GLUCOSE METER 84 mg/dL                         TESTED AT 

[place] [address]



             ([facility]) (test code =                                        [place] [address]



             1538)                                               



[id number] 14:57:00





             Test Item    Value        Reference Range Interpretation Comments

 

             RPR SCREEN ([facility]) (test code = Nonreactive  Nonreactive          

     



             420)                                                



POCT-GLUCOSE [id number] 12:20:00





             Test Item    Value        Reference Range Interpretation Comments

 

             POC-GLUCOSE METER 141 mg/dL           H            TESTED AT 

[place] [address]



             (BEAKER) (test code =                                        [place] WARNER [facility]



             1538)                                               [address]



POCT-GLUCOSE [id number] 08:06:00





             Test Item    Value        Reference Range Interpretation Comments

 

             POC-GLUCOSE METER 86 mg/dL                         TESTED AT 

[facility] [address]



             (BEAKER) (test code =                                        [place] [facility] [address]



             1538)                                               



CBC W/PLT COUNT &amp; AUTO [id number] 05:34:00





             Test Item    Value        Reference Range Interpretation Comments

 

             WHITE BLOOD CELL COUNT (BEAKER) 6.7 K/ L     4.0-10.0              

    



             (test code = 775)                                        

 

             RED BLOOD CELL COUNT (BEAKER) 3.94 M/ L    4.20-5.80    L          

  



             (test code = 761)                                        

 

             HEMOGLOBIN (BEAKER) (test code = 14.0 GM/DL   13.0-16.8            

     



             410)                                                

 

             HEMATOCRIT (BEAKER) (test code = 39.2 %       40.0-50.0    L       

     



             411)                                                

 

             MEAN CORPUSCULAR VOLUME (BEAKER) 99.6 fL      82.0-98.0    H       

     



             (test code = 753)                                        

 

             MEAN CORPUSCULAR HEMOGLOBIN 35.5 pg      27.0-33.0    H            



             (BEAKER) (test code = 751)                                        

 

             MEAN CORPUSCULAR HEMOGLOBIN CONC 35.6 GM/DL   32.0-36.0            

     



             (BEAKER) (test code = 752)                                        

 

             RED CELL DISTRIBUTION WIDTH 11.8 %       10.3-14.2                 



             (BEAKER) (test code = 412)                                        

 

             PLATELET COUNT (BEAKER) (test 129 K/CU MM  150-430      L          

  



             code = 756)                                         

 

             MEAN PLATELET VOLUME (BEAKER) 7.9 fL       6.5-10.5                

  



             (test code = 754)                                        

 

             NUCLEATED RED BLOOD CELLS 0 /100 WBC   0-0                       



             (BEAKER) (test code = 413)                                        

 

             NEUTROPHILS RELATIVE PERCENT 49 %                                  

 



             (BEAKER) (test code = 429)                                        

 

             LYMPHOCYTES RELATIVE PERCENT 38 %                                  

 



             (BEAKER) (test code = 430)                                        

 

             MONOCYTES RELATIVE PERCENT 8 %                                    



             (BEAKER) (test code = 431)                                        

 

             EOSINOPHILS RELATIVE PERCENT 4 %                                   

 



             (BEAKER) (test code = 432)                                        

 

             BASOPHILS RELATIVE PERCENT 1 %                                    



             (BEAKER) (test code = 437)                                        

 

             NEUTROPHILS ABSOLUTE COUNT 3.24 K/ L    1.80-8.00                 



             (BEAKER) (test code = 670)                                        

 

             LYMPHOCYTES ABSOLUTE COUNT 2.55 K/ L    1.48-4.50                 



             (BEAKER) (test code = 414)                                        

 

             MONOCYTES ABSOLUTE COUNT (BEAKER) 0.53 K/ L    0.00-1.30           

      



             (test code = 415)                                        

 

             EOSINOPHILS ABSOLUTE COUNT 0.27 K/ L    0.00-0.50                 



             (BEAKER) (test code = 416)                                        

 

             BASOPHILS ABSOLUTE COUNT (BEAKER) 0.07 K/ L    0.00-0.20           

      



             (test code = 417)                                        



0.00BASIC METABOLIC [id number] 05:17:00





             Test Item    Value        Reference Range Interpretation Comments

 

             SODIUM (BEAKER) 143 meq/L    136-145                   



             (test code = 381)                                        

 

             POTASSIUM (BEAKER) 3.6 meq/L    3.5-5.1                   



             (test code = 379)                                        

 

             CHLORIDE (BEAKER) 116 meq/L           H            



             (test code = 382)                                        

 

             CO2 (BEAKER) (test 22 meq/L     22-29                     



             code = 355)                                         

 

             BLOOD UREA NITROGEN 20 mg/dL     7-21                      



             (BEAKER) (test code                                        



             = 354)                                              

 

             CREATININE (BEAKER) 1.10 mg/dL   0.57-1.25                 



             (test code = 358)                                        

 

             GLUCOSE RANDOM 88 mg/dL                         



             (BEAKER) (test code                                        



             = 652)                                              

 

             CALCIUM (BEAKER) 7.3 mg/dL    8.4-10.2     L            



             (test code = 697)                                        

 

             EGFR (BEAKER) (test 64 mL/min/1.73                           ESTIMA

GLEN GFR IS



             code = 1092) sq m                                   NOT AS ACCURATE

 AS



                                                                 CREATININE



                                                                 CLEARANCE IN



                                                                 PREDICTING



                                                                 GLOMERULAR



                                                                 FILTRATION RATE

.



                                                                 ESTIMATED GFR I

S



                                                                 NOT APPLICABLE 

FOR



                                                                 DIALYSIS PATIEN

TS.



FastingSpecimen slightly [id number] 05:13:00





             Test Item    Value        Reference Range Interpretation Comments

 

             PHOSPHORUS (BEAKER) (test code = 3.0 mg/dL    2.3-4.7              

     



             604)                                                



[id number] 05:13:00





             Test Item    Value        Reference Range Interpretation Comments

 

             MAGNESIUM (BEAKER) (test code = 1.6 mg/dL    1.6-2.6               

    



             627)                                                



FastingLIPID [id number] 05:13:00





             Test Item    Value        Reference Range Interpretation Comments

 

             TRIGLYCERIDES (BEAKER) (test code = 78 mg/dL                       

        



             540)                                                

 

             CHOLESTEROL (BEAKER) (test code = 80 mg/dL                         

      



             631)                                                

 

             HDL CHOLESTEROL (BEAKER) (test code 21 mg/dL                       

        



             = 976)                                              

 

             LDL CHOLESTEROL CALCULATED (Evi) 43 mg/dL                       

        



             (test code = 633)                                        



Triglyceride Reference Range: Low Risk &lt;150 Borderline 150-199 High Risk 200-
499 Very High Risk &gt;=500Cholesterol Reference Range: Low Risk &lt;200 
Borderline 200-239 High Risk &gt;240HDL Cholesterol Reference Range: Low Risk 
&gt;=60 High Risk &lt;40LDL Cholesterol Reference Range: Optimal &lt;100 Near 
Optimal 100-129 Borderline 130-159 High 160-189  Very High &gt;=190 
FastingSpecimen slightly ictericPOCT-GLUCOSE [id number] 22:21:00





             Test Item    Value        Reference Range Interpretation Comments

 

             POC-GLUCOSE METER 91 mg/dL                         TESTED AT 

[facility] [address]



             (Evi) (test code =                                        [place] [address]



             1538)                                               



HEMOGLOBIN [id number] 21:17:00





             Test Item    Value        Reference Range Interpretation Comments

 

             HEMOGLOBIN A1C (Evi) (test code = 5.0 %        4.3-6.1          

         



             368)                                                



VITAMIN B12 AND [id number] 21:00:00





             Test Item    Value        Reference Range Interpretation Comments

 

             VITAMIN B12 (Evi) (test code = 433 pg/mL    213-816             

      



             774)                                                

 

             FOLATE (Evi) (test code = 362) > ng/mL      >=7.0               

      



Effective 2014: Folate Reference Range ChangeNew: &gt;=7.0 Previous: 
&gt;=5.4PROTHROMBIN [id number] 19:14:00





             Test Item    Value        Reference Range Interpretation Comments

 

             PROTIME (Evi) (test code = 14.2 seconds 11.7-14.7               

  



             759)                                                

 

             INR (Evi) (test code = 370) 1.1          <=5.9                  

   



RECOMMENDED COUMADIN/WARFARIN INR THERAPY RANGESSTANDARD DOSE: 2.0 - 3.0 
Includes: PROPHYLAXIS for venous thrombosis, systemic embolization; TREATMENT 
for venous thrombosis and/or pulmonary embolus.HIGH RISK: Target INR is 2.5-3.5 
for patients with mechanical heart valves.[id number] 19:14:00





             Test Item    Value        Reference Range Interpretation Comments

 

             PARTIAL THROMBOPLASTIN TIME 36.0 seconds 22.5-36.0                 



             (Evi) (test code = 760)                                        



BASIC METABOLIC [id number] 19:09:00





             Test Item    Value        Reference Range Interpretation Comments

 

             SODIUM (BEAKER) 140 meq/L    136-145                   



             (test code = 381)                                        

 

             POTASSIUM (BEAKER) 4.2 meq/L    3.5-5.1                   



             (test code = 379)                                        

 

             CHLORIDE (BEAKER) 109 meq/L           H            



             (test code = 382)                                        

 

             CO2 (BEAKER) (test 21 meq/L     22-29        L            



             code = 355)                                         

 

             BLOOD UREA NITROGEN 21 mg/dL     7-21                      



             (BEAKER) (test code                                        



             = 354)                                              

 

             CREATININE (BEAKER) 1.41 mg/dL   0.57-1.25    H            



             (test code = 358)                                        

 

             GLUCOSE RANDOM 90 mg/dL                         



             (BEAKER) (test code                                        



             = 652)                                              

 

             CALCIUM (BEAKER) 9.1 mg/dL    8.4-10.2                  



             (test code = 697)                                        

 

             EGFR (BEAKER) (test 48 mL/min/1.73                           ESTIMA

GLEN GFR IS



             code = 1092) sq m                                   NOT AS ACCURATE

 AS



                                                                 CREATININE



                                                                 CLEARANCE IN



                                                                 PREDICTING



                                                                 GLOMERULAR



                                                                 FILTRATION RATE

.



                                                                 ESTIMATED GFR I

S



                                                                 NOT APPLICABLE 

FOR



                                                                 DIALYSIS PATIEN

TS.



Specimen slightly ictericCBC W/PLT COUNT &amp; AUTO [id number] 
19:00:00





             Test Item    Value        Reference Range Interpretation Comments

 

             WHITE BLOOD CELL COUNT (BEAKER) 9.5 K/ L     4.0-10.0              

    



             (test code = 775)                                        

 

             RED BLOOD CELL COUNT (BEAKER) 4.91 M/ L    4.20-5.80               

  



             (test code = 761)                                        

 

             HEMOGLOBIN (BEAKER) (test code = 16.3 GM/DL   13.0-16.8            

     



             410)                                                

 

             HEMATOCRIT (BEAKER) (test code = 47.7 %       40.0-50.0            

     



             411)                                                

 

             MEAN CORPUSCULAR VOLUME (BEAKER) 97.2 fL      82.0-98.0            

     



             (test code = 753)                                        

 

             MEAN CORPUSCULAR HEMOGLOBIN 33.2 pg      27.0-33.0    H            



             (BEAKER) (test code = 751)                                        

 

             MEAN CORPUSCULAR HEMOGLOBIN CONC 34.2 GM/DL   32.0-36.0            

     



             (BEAKER) (test code = 752)                                        

 

             RED CELL DISTRIBUTION WIDTH 13.1 %       10.3-14.2                 



             (BEAKER) (test code = 412)                                        

 

             PLATELET COUNT (BEAKER) (test 148 K/CU MM  150-430      L          

  



             code = 756)                                         

 

             MEAN PLATELET VOLUME (BEAKER) 7.7 fL       6.5-10.5                

  



             (test code = 754)                                        

 

             NUCLEATED RED BLOOD CELLS 0 /100 WBC   0-0                       



             (BEAKER) (test code = 413)                                        

 

             NEUTROPHILS RELATIVE PERCENT 54 %                                  

 



             (BEAKER) (test code = 429)                                        

 

             LYMPHOCYTES RELATIVE PERCENT 35 %                                  

 



             (BEAKER) (test code = 430)                                        

 

             MONOCYTES RELATIVE PERCENT 7 %                                    



             (BEAKER) (test code = 431)                                        

 

             EOSINOPHILS RELATIVE PERCENT 3 %                                   

 



             (BEAKER) (test code = 432)                                        

 

             BASOPHILS RELATIVE PERCENT 1 %                                    



             (BEAKER) (test code = 437)                                        

 

             NEUTROPHILS ABSOLUTE COUNT 5.11 K/ L    1.80-8.00                 



             (BEAKER) (test code = 670)                                        

 

             LYMPHOCYTES ABSOLUTE COUNT 3.35 K/ L    1.48-4.50                 



             (BEAKER) (test code = 414)                                        

 

             MONOCYTES ABSOLUTE COUNT (BEAKER) 0.66 K/ L    0.00-1.30           

      



             (test code = 415)                                        

 

             EOSINOPHILS ABSOLUTE COUNT 0.32 K/ L    0.00-0.50                 



             (BEAKER) (test code = 416)                                        

 

             BASOPHILS ABSOLUTE COUNT (BEAKER) 0.08 K/ L    0.00-0.20           

      



             (test code = 417)                                        



0.00

## 2022-09-22 NOTE — RAD REPORT
EXAM DESCRIPTION:  RAD - Chest Single View - 9/22/2022 10:18 pm

 

CLINICAL HISTORY:  acute exacerbation of asthma

 

COMPARISON:  Two view chest 10/14/2021

 

TECHNIQUE:  AP portable chest image was obtained 9/22/2022 10:18 pm .

 

FINDINGS:  No focal mass or consolidation. Interstitial pattern matches comparison. No abnormal perib
ronchial thickening.

 

Sternotomy wires are in place. Heart and vasculature are normal. No measurable pleural effusion and n
o pneumothorax. No acute bony abnormality seen. No acute aortic findings suspected.

 

IMPRESSION:  No acute cardiopulmonary process.

 

No significant change from comparison.

## 2022-09-23 LAB
ALBUMIN SERPL BCP-MCNC: 3 G/DL (ref 3.4–5)
ALP SERPL-CCNC: 44 U/L (ref 45–117)
ALT SERPL W P-5'-P-CCNC: 32 U/L (ref 12–78)
AST SERPL W P-5'-P-CCNC: 24 U/L (ref 15–37)
BUN BLD-MCNC: 26 MG/DL (ref 7–18)
GLUCOSE SERPLBLD-MCNC: 122 MG/DL (ref 74–106)
HCT VFR BLD CALC: 43.5 % (ref 39.6–49)
LYMPHOCYTES # SPEC AUTO: 1.9 K/UL (ref 0.7–4.9)
MAGNESIUM SERPL-MCNC: 2 MG/DL (ref 1.8–2.4)
MCV RBC: 95.1 FL (ref 80–100)
MORPHOLOGY BLD-IMP: (no result)
PMV BLD: 7.7 FL (ref 7.6–11.3)
POTASSIUM SERPL-SCNC: 4.2 MMOL/L (ref 3.5–5.1)
RBC # BLD: 4.57 M/UL (ref 4.33–5.43)
TSH SERPL DL<=0.05 MIU/L-ACNC: 2.35 UIU/ML (ref 0.36–3.74)

## 2022-09-23 RX ADMIN — ALBUTEROL SULFATE SCH MG: 2.5 SOLUTION RESPIRATORY (INHALATION) at 14:43

## 2022-09-23 RX ADMIN — IPRATROPIUM BROMIDE SCH: 0.5 SOLUTION RESPIRATORY (INHALATION) at 02:00

## 2022-09-23 RX ADMIN — ALBUTEROL SULFATE SCH MG: 2.5 SOLUTION RESPIRATORY (INHALATION) at 08:38

## 2022-09-23 RX ADMIN — ALBUTEROL SULFATE SCH MG: 2.5 SOLUTION RESPIRATORY (INHALATION) at 20:00

## 2022-09-23 RX ADMIN — GUAIFENESIN AND CODEINE PHOSPHATE PRN ML: 100; 10 SOLUTION ORAL at 22:13

## 2022-09-23 RX ADMIN — MOMETASONE FUROATE AND FORMOTEROL FUMARATE DIHYDRATE SCH PUFF: 200; 5 AEROSOL RESPIRATORY (INHALATION) at 09:18

## 2022-09-23 RX ADMIN — ALBUTEROL SULFATE SCH: 2.5 SOLUTION RESPIRATORY (INHALATION) at 02:00

## 2022-09-23 RX ADMIN — DOXYCYCLINE SCH MLS: 100 INJECTION, POWDER, LYOPHILIZED, FOR SOLUTION INTRAVENOUS at 08:28

## 2022-09-23 RX ADMIN — IPRATROPIUM BROMIDE SCH MG: 0.5 SOLUTION RESPIRATORY (INHALATION) at 08:38

## 2022-09-23 RX ADMIN — GUAIFENESIN AND CODEINE PHOSPHATE PRN ML: 100; 10 SOLUTION ORAL at 16:22

## 2022-09-23 RX ADMIN — IPRATROPIUM BROMIDE SCH MG: 0.5 SOLUTION RESPIRATORY (INHALATION) at 20:00

## 2022-09-23 RX ADMIN — IPRATROPIUM BROMIDE SCH MG: 0.5 SOLUTION RESPIRATORY (INHALATION) at 14:43

## 2022-09-23 RX ADMIN — LOSARTAN POTASSIUM AND HYDROCHLOROTHIAZIDE SCH TAB: 50; 12.5 TABLET, FILM COATED ORAL at 08:21

## 2022-09-23 RX ADMIN — MOMETASONE FUROATE AND FORMOTEROL FUMARATE DIHYDRATE SCH PUFF: 200; 5 AEROSOL RESPIRATORY (INHALATION) at 20:38

## 2022-09-23 RX ADMIN — DOXYCYCLINE SCH MLS: 100 INJECTION, POWDER, LYOPHILIZED, FOR SOLUTION INTRAVENOUS at 20:37

## 2022-09-23 RX ADMIN — GUAIFENESIN AND CODEINE PHOSPHATE PRN ML: 100; 10 SOLUTION ORAL at 08:36

## 2022-09-23 NOTE — HP
Date of Admission:  09/22/2022



Chief Complaint:  Cough, congestion, shortness of breath.



History Of Present Illness:  This is an 87-year-old very pleasant male patient, who came into office 
today with 1-week history of cough, chest congestion, coughing up some mucus, wheezing, and shortness
 of breath.  [facility] with all these complaints and he was sent home 
with Augmentin, albuterol inhaler, promethazine, and Medrol Dosepak.  He has finished all these medic
ation except still continuing his antibiotic, which he will be finishing soon, but so far, the patien
miesha reports that he has not improved at all and that is why he came to office.  After he was evaluated,
 he was admitted to the hospital for further evaluation and management of this acute exacerbation of 
his asthma problem.



Allergies:  TO AMBIEN, CAUSING HALLUCINATIONS.



Medications:  Atorvastatin 40 mg daily at bedtime, cetirizine 10 mg daily, Advair 250/50 one puff 2 t
imes a day, losartan/HCTZ 50/12.5 mg 1 tablet daily in morning, Xarelto 20 mg daily, sotalol 80 mg 2 
times a day, meclizine 25 mg 3 times a day as needed for dizziness.



Review of Systems:

Respiratory:  As mentioned above. 



All other systems reviewed and negative.



Past Medical History:  Significant for prior history of stroke, allergic rhinitis, mild persistent as
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
 artery disease, paroxysmal atrial fibrillation, prostate cancer, and chronic kidney disease.



Past Surgical History:  Coronary artery bypass surgery in 2006, IVC filter placement in 2006, cholecy
stectomy, appendectomy, prostatectomy in 1993.



Family History:  Not pertinent.



Social History:  Negative for smoking and alcohol use.



Physical Examination:

Vital Signs:  When he came into office, blood pressure 159/91, pulse 53, respiratory rate 18, tempera
ture 98 degrees Fahrenheit, weight 203.2 pounds, height 71 inches, oxygen saturation 91%. 

General:  Awake, alert, oriented, not in distress. 

HEENT:  Head atraumatic, normocephalic.  Conjunctivae nonerythematous.  Sclerae white.  Mouth, no thr
ush or edema noted.  Ears/Nose, no mass, lesion, discharge noted. 

Neck:  Supple. No JVD, lymph nodes, bruit, thyromegaly noted. 

Lungs:  Bilateral good equal air entry, not in any respiratory distress, was noted to have very frequ
ent coughing and wheezing present in bilateral lower one-third lung fields. 

Heart:  Normal heart sounds, no murmur or gallop. 

Abdomen:  Soft, bowel sounds normal. No guarding, rigidity, tenderness, mass, hepatosplenomegaly, dis
tention, or bruit noted. 

Extremities:  No leg edema.  No calf tenderness. 

Skin:  No rash, ulcer, cellulitis. 

Lymphatics:  No lymph node enlargement in neck, supraclavicular, infraclavicular region. 

Neuro:  No focal neurological deficit. 

Chest:  Unremarkable. 

External Genitalia:  Deferred. 

Rectal:  Deferred.



Laboratory Data:  Chest x-ray no acute cardiopulmonary changes.  CBC, chemistry to be done tomorrow m
orning.  We will follow up on results.



Impression:  

1.Acute exacerbation of mild persistent asthma.

2.Hypertension.

3.Hyperlipidemia.

4.Coronary artery disease.

5.Paroxysmal atrial fibrillation.

6.Chronic anticoagulation therapy.

7.Allergic rhinitis.

8.Prostate cancer.



Plan:  We will go ahead and admit the patient to hospital for further evaluation and management of th
is problem.  The patient is appropriate for inpatient and is expected to spend 2 midnights in the [facility].  We will continue home medications per order.  He has not taken his blood pressure medication 
this morning.  We will continue his antihypertensive medication.  Monitor blood pressure if necessary
.  Adjust medication.  We will continue his cholesterol medication, which is atorvastatin, and for as
thma, we do not have Advair in the hospital, so we will use Dulera 2 puffs 2 times a day.  Give albut
shakira and Atrovent nebulizer treatment.  We will go ahead and start him on IV doxycycline and IV stero
id.  Continue Xarelto.  Follow up on blood work tomorrow morning and plan of treatment discussed with
 him.  The patient is expected to spend 2 midnights and is appropriate for inpatient stay.





AMEE/MODL

DD:  09/23/2022 06:24:58Voice ID:  [id number]

## 2022-09-23 NOTE — PN
Subjective:  Seen this morning for followup.  No new complaints overnight.  Still continues to have c
ough, congestion, and wheezing.



Objective:  Vital Signs:  Reviewed. 

HEENT:  Unremarkable. 

Lungs:  Bilateral good equal air entry.  Clear to auscultation.  No rales.  No wheezing. 

Heart:  Sounds normal. 

Abdomen:  Soft.  Bowel sounds normal.  No guarding, rigidity, tenderness, or distention. 

Extremities:  No leg edema.



Laboratory Data:  White count 9.4, hemoglobin 15.2, platelets 183.  Sodium 140, potassium 4.2, chlori
de 106, bicarb 30, BUN 26, creatinine 1.72, glucose 122.  Liver function tests unremarkable.  TSH 2.3
5.



Impression:  

1.Mild persistent asthma, with acute exacerbation.

2.Paroxysmal atrial fibrillation.

3.Hypertension.

4.Chronic kidney disease, stage 3B.

5.Chronic anticoagulation therapy.



Plan:  We will go ahead and discontinue sotalol because of the patient's heart block.  He is having i
ntermittent heart block, currently is in sinus rhythm, when I saw him at 57 per minute, but during ni
ghttime, he had Wenckebach phenomena.  We will follow up with cardiologist regarding this and in view
 of this heart block, we will discontinue sotalol.  Last night, the patient had side effect with IV S
hailey-Medrol within 15 to 20 minutes after he received it.  He started to have bilateral chest pain and
 he started to have excessive amount of sweating and then subsequently his leg and hand started jerki
ng.  As soon as nurse contacted me, IV Benadryl 25 mg x1 dose was ordered.  His symptoms resolved aft
er that, has not had any recurrence.  He was informed that he should label himself as allergic to IV 
Solu-Medrol.  It is important to note that on an outpatient basis, he has taken overall prednisone an
d recently took methylprednisolone a week ago without any side effect.  We will start oral prednisone
 during this hospitalization.  He was prescribed Advair in the past, but he has not used it in a long
 time and he was advised that upon discharge, he should restart Advair and while in the hospital, we 
will start using Dulera as we do not have Advair in the hospital pharmacy.  Continue IV antibiotic, w
hich is doxycycline and I will see him tomorrow for followup, possible discharge tomorrow.





AMEE/MODL

DD:  09/23/2022 10:20:33Voice ID:  [id number]

DT:  09/23/2022 20:49:03Report ID:  [id number]

## 2022-09-24 VITALS — TEMPERATURE: 97.3 F

## 2022-09-24 VITALS — DIASTOLIC BLOOD PRESSURE: 70 MMHG | SYSTOLIC BLOOD PRESSURE: 110 MMHG

## 2022-09-24 RX ADMIN — DOXYCYCLINE SCH MLS: 100 INJECTION, POWDER, LYOPHILIZED, FOR SOLUTION INTRAVENOUS at 09:12

## 2022-09-24 RX ADMIN — MOMETASONE FUROATE AND FORMOTEROL FUMARATE DIHYDRATE SCH PUFF: 200; 5 AEROSOL RESPIRATORY (INHALATION) at 09:00

## 2022-09-24 RX ADMIN — ALBUTEROL SULFATE SCH MG: 2.5 SOLUTION RESPIRATORY (INHALATION) at 09:41

## 2022-09-24 RX ADMIN — LOSARTAN POTASSIUM AND HYDROCHLOROTHIAZIDE SCH TAB: 50; 12.5 TABLET, FILM COATED ORAL at 09:13

## 2022-09-24 RX ADMIN — GUAIFENESIN AND CODEINE PHOSPHATE PRN ML: 100; 10 SOLUTION ORAL at 09:20

## 2022-09-24 RX ADMIN — IPRATROPIUM BROMIDE SCH MG: 0.5 SOLUTION RESPIRATORY (INHALATION) at 01:10

## 2022-09-24 RX ADMIN — ALBUTEROL SULFATE SCH MG: 2.5 SOLUTION RESPIRATORY (INHALATION) at 01:10

## 2022-09-24 RX ADMIN — IPRATROPIUM BROMIDE SCH MG: 0.5 SOLUTION RESPIRATORY (INHALATION) at 09:41

## 2022-09-24 NOTE — DS
Date of Discharge:  09/24/2022



The patient was seen this morning for followup.  No new complaints or problems reported by the patien
t.  Lying in bed, not in distress.



Objective:  Vital Signs:  Reviewed. 

HEENT:  Unremarkable. 

Lungs:  Clear to auscultation.  No wheezing.  No rales. 

Heart:  Sounds normal. 

Abdomen:  Soft.  Bowel sounds normal.  No guarding, rigidity, tenderness, or distention. 

Extremities:  No leg edema.



Discharge Medications And Instructions:  Continue all prior home medications except following changes
.

1.Stop Xarelto 20 mg dose.

2.Start Xarelto 15 mg, take 1 tablet by mouth daily with evening meal.

3.Use Dulera inhaler 2 puffs by mouth 2 times a day, rinse mouth with water after use.  Once he ran 
out of this inhaler, please start Advair 1 puff 2 times a day, rinse mouth with water.

4.Start doxycycline 100 mg, take 1 capsule by mouth 2 times a day for 5 days.  Avoid any excessive d
irect sunlight exposure while taking this antibiotic as it may cause sunburn.

5.Follow up at my office in 2 weeks.

6.Follow up with Dr. Hernandez/Dr. Dow next week to get Holter monitor test done.



Hospital Course:  This is an 87-year-old very pleasant male patient, who was admitted to the hospital
 with cough, congestion, wheezing, and shortness of breath.  Please see dictated H and P for more inf
ormation.  After the patient was evaluated at office, he was admitted to the hospital.  His chest x-r
ay did not show any pneumonia.  He was started on nebulizer treatment, IV antibiotic which was doxycy
silverio.  He was started on IV Solu-Medrol and after first dose of Solu-Medrol, he actually had a side 
effect where he felt like having chest pain, sweating and jerky movements of his hands and legs.  So,
 Solu-Medrol was discontinued.  One dose of 25 mg Benadryl IV was given and his symptoms resolved and
 has not had any recurrence of symptoms.  He has taken oral steroid medication in the past without an
y side effect, but he was asked to remember that he should not take Solu-Medrol in the future because
 of the side effect that he had during this hospitalization.  He had bradycardia with second-degree t
ype 1 heart block, which is Wenckebach phenomenon and Cardiology consultation was requested and I did
 talk to cardiologist Dr. Dow yesterday and he has recommended for the patient took sotalol that stefany harris takes at home, which is 40 mg once a day and he will do outpatient Holter monitor to look into if stefany harris has any more serious heart block that may require pacemaker or not and all these details were discu
ssed with the patient.  Today, when I communicated with him about all these details, his daughter was
 present with him at bedside.  The patient was not taking his Advair at home and I have instructed hi
m that he should stay on it as a maintenance inhaler.



Discharge Diagnoses:  

1.Acute exacerbation of asthma.

2.Hypertension.

3.Chronic kidney disease, stage 3B.

4.Paroxysmal atrial fibrillation.

5.Chronic anticoagulation therapy.

6.Second-degree type 1 AV block.





AMEE/MODL

DD:  09/24/2022 10:13:04Voice ID:  [id number]

DT:  09/24/2022 10:35:16Report ID:  [id number]

## 2022-09-24 NOTE — CON
Date of Consultation:  09/23/2022



Reason For Consultation:  Heart block on telemetry.



History Of Present Illness:  This is an 87-year-old male with known past medical history of atrial fi
brillation who is on sotalol and anticoagulant.  He has upper respiratory tract infection along with 
asthma exacerbation and he was hospitalized and on telemetry, he had a heart block.  Hence, I was con
sulted to inquire about the treatment of the atrial fibrillation.  Patient has been on sotalol and Xa
relto for his AFib.  He has also past medical history of Hypertension, dyslipidemia, and asthma.



Past Medical History:  As outlined above in HPI.



Medications:  Refer to reconciliation sheet for detailed list.



Allergies:  AMBIEN.



Family History:  No premature coronary artery disease or cancer.



Social History:  Does not smoke or drink.  Does not use any drugs.



Review of Systems:

All systems reviewed and are negative except as mentioned in HPI.



Physical Examination:

Vital signs:  Reviewed.  Temperature is 97.3, pulse 69, breathing at 16, blood pressure 119/79, satur
ating 98%. 

General:  A pleasant elderly male, in no apparent distress. 

Head and Neck:  Pupils are equal and reactive to light.  Intact eye movements.  No JVD.  No cervical 
lymphadenopathy.  Neck supple.  Thyroid is not enlarged. 

Lungs:  Clear to auscultation bilaterally.  No rhonchi, rales, or crackles.  No accessory muscle use 
or muscle retraction.  

Heart: Irregular.  No extra sounds. 

Abdomen:  Soft, nontender.  Bowel sounds positive.  No organomegaly.  No masses or hernia.  No rigidi
ty or rebound. 

Extremities:  No edema, clubbing, or cyanosis.  Intact pulses. 

Skin:  No rash. 

Neurologic:  Alert, awake.  No acute focal deficits appreciated.



Investigations:  BUN 26, creatinine 1.72.  Hemoglobin 15.2.



Assessment And Recommendations:  

1.Atrial fibrillation.  He is in sinus now; however, he has on telemetry and on EKG, second-degree a
trioventricular block type 1 Wenckebach and this is a low risk heart block.  I will continue sotalol 
at the small dose, which is 40 mg that he was on and Xarelto.  Keep him on telemetry and we will krupa
tor for any worsening of his heart block.  I will plan for a long-term event monitor to be done as an
 outpatient once he is released.

2.Asthma exacerbation, seems to be getting stable.

3.Dyslipidemia, on Lipitor.

4.Renal failure, unsure if this is acute or chronic.  Re-evaluate after hydration.





SR/MODL

DD:  09/23/2022 18:32:53Voice ID:  [id number]

DT:  09/24/2022 00:44:59Report ID:  [id number]

## 2022-09-26 NOTE — EKG
Test Date:    2022-09-23               Test Time:    00:07:32

Technician:   RT                                     

                                                     

MEASUREMENT RESULTS:                                       

Intervals:                                           

Rate:         47                                     

AK:                                                  

QRSD:         118                                    

QT:           480                                    

QTc:          424                                    

Axis:                                                

P:            54                                     

AK:                                                  

QRS:          -58                                    

T:            171                                    

                                                     

INTERPRETIVE STATEMENTS:                                       

                                                     

Sinus bradycardia with second degree A-V block type I

Left axis deviation

Inferior infarct, age undetermined

ST & T wave abnormality, consider lateral ischemia

Abnormal ECG

Compared to ECG 09/24/2020 14:17:24

Myocardial infarct finding now present

ST (T wave) deviation now present

Possible ischemia now present



Electronically Signed On 09-26-22 14:13:04 CDT by Brett Dow

## 2023-02-11 ENCOUNTER — HOSPITAL ENCOUNTER (INPATIENT)
Dept: HOSPITAL 97 - ER | Age: 88
LOS: 2 days | Discharge: HOME | DRG: 156 | End: 2023-02-13
Attending: INTERNAL MEDICINE | Admitting: INTERNAL MEDICINE
Payer: COMMERCIAL

## 2023-02-11 VITALS — BODY MASS INDEX: 29.5 KG/M2

## 2023-02-11 DIAGNOSIS — H61.22: Primary | ICD-10-CM

## 2023-02-11 DIAGNOSIS — Z86.711: ICD-10-CM

## 2023-02-11 DIAGNOSIS — Z95.0: ICD-10-CM

## 2023-02-11 DIAGNOSIS — Z85.828: ICD-10-CM

## 2023-02-11 DIAGNOSIS — H92.02: ICD-10-CM

## 2023-02-11 DIAGNOSIS — Z85.46: ICD-10-CM

## 2023-02-11 DIAGNOSIS — Z86.73: ICD-10-CM

## 2023-02-11 DIAGNOSIS — E86.9: ICD-10-CM

## 2023-02-11 DIAGNOSIS — J45.30: ICD-10-CM

## 2023-02-11 DIAGNOSIS — E78.5: ICD-10-CM

## 2023-02-11 DIAGNOSIS — Z20.822: ICD-10-CM

## 2023-02-11 DIAGNOSIS — I12.9: ICD-10-CM

## 2023-02-11 DIAGNOSIS — D72.829: ICD-10-CM

## 2023-02-11 DIAGNOSIS — Z88.8: ICD-10-CM

## 2023-02-11 DIAGNOSIS — I48.0: ICD-10-CM

## 2023-02-11 DIAGNOSIS — N18.32: ICD-10-CM

## 2023-02-11 DIAGNOSIS — Z90.49: ICD-10-CM

## 2023-02-11 DIAGNOSIS — I25.10: ICD-10-CM

## 2023-02-11 DIAGNOSIS — Z79.01: ICD-10-CM

## 2023-02-11 DIAGNOSIS — I95.9: ICD-10-CM

## 2023-02-11 DIAGNOSIS — Z95.1: ICD-10-CM

## 2023-02-11 LAB
ALBUMIN SERPL BCP-MCNC: 3.2 G/DL (ref 3.4–5)
ALP SERPL-CCNC: 47 U/L (ref 45–117)
ALT SERPL W P-5'-P-CCNC: 28 U/L (ref 16–61)
AST SERPL W P-5'-P-CCNC: 26 U/L (ref 15–37)
BUN BLD-MCNC: 29 MG/DL (ref 7–18)
GLUCOSE SERPLBLD-MCNC: 116 MG/DL (ref 74–106)
HCT VFR BLD CALC: 43.2 % (ref 39.6–49)
INR BLD: 2.1
LYMPHOCYTES # SPEC AUTO: 0.7 K/UL (ref 0.7–4.9)
MAGNESIUM SERPL-MCNC: 1.7 MG/DL (ref 1.6–2.4)
MCV RBC: 96.1 FL (ref 80–100)
PMV BLD: 7.6 FL (ref 7.6–11.3)
POTASSIUM SERPL-SCNC: 3.9 MMOL/L (ref 3.5–5.1)
RBC # BLD: 4.5 M/UL (ref 4.33–5.43)
SARS-COV-2 RNA RESP QL NAA+PROBE: NEGATIVE
TROPONIN I SERPL HS-MCNC: 33 PG/ML (ref ?–58.9)

## 2023-02-11 PROCEDURE — 83735 ASSAY OF MAGNESIUM: CPT

## 2023-02-11 PROCEDURE — 80048 BASIC METABOLIC PNL TOTAL CA: CPT

## 2023-02-11 PROCEDURE — 87040 BLOOD CULTURE FOR BACTERIA: CPT

## 2023-02-11 PROCEDURE — 0240U: CPT

## 2023-02-11 PROCEDURE — 80053 COMPREHEN METABOLIC PANEL: CPT

## 2023-02-11 PROCEDURE — 36415 COLL VENOUS BLD VENIPUNCTURE: CPT

## 2023-02-11 PROCEDURE — 87077 CULTURE AEROBIC IDENTIFY: CPT

## 2023-02-11 PROCEDURE — 85025 COMPLETE CBC W/AUTO DIFF WBC: CPT

## 2023-02-11 PROCEDURE — 96365 THER/PROPH/DIAG IV INF INIT: CPT

## 2023-02-11 PROCEDURE — 96361 HYDRATE IV INFUSION ADD-ON: CPT

## 2023-02-11 PROCEDURE — 70450 CT HEAD/BRAIN W/O DYE: CPT

## 2023-02-11 PROCEDURE — 71045 X-RAY EXAM CHEST 1 VIEW: CPT

## 2023-02-11 PROCEDURE — 87086 URINE CULTURE/COLONY COUNT: CPT

## 2023-02-11 PROCEDURE — 80076 HEPATIC FUNCTION PANEL: CPT

## 2023-02-11 PROCEDURE — 99285 EMERGENCY DEPT VISIT HI MDM: CPT

## 2023-02-11 PROCEDURE — 93005 ELECTROCARDIOGRAM TRACING: CPT

## 2023-02-11 PROCEDURE — 85730 THROMBOPLASTIN TIME PARTIAL: CPT

## 2023-02-11 PROCEDURE — 74176 CT ABD & PELVIS W/O CONTRAST: CPT

## 2023-02-11 PROCEDURE — 71250 CT THORAX DX C-: CPT

## 2023-02-11 PROCEDURE — 83605 ASSAY OF LACTIC ACID: CPT

## 2023-02-11 PROCEDURE — 84484 ASSAY OF TROPONIN QUANT: CPT

## 2023-02-11 PROCEDURE — 81003 URINALYSIS AUTO W/O SCOPE: CPT

## 2023-02-11 PROCEDURE — 96366 THER/PROPH/DIAG IV INF ADDON: CPT

## 2023-02-11 PROCEDURE — 85610 PROTHROMBIN TIME: CPT

## 2023-02-11 PROCEDURE — 87088 URINE BACTERIA CULTURE: CPT

## 2023-02-11 PROCEDURE — 87186 SC STD MICRODIL/AGAR DIL: CPT

## 2023-02-11 NOTE — XMS REPORT
Continuity of Care Document

                          Created on:2023



Patient:MARIANNE GRULLON

Sex:Male

:1934

External Reference #:[id number]





Demographics







                          Address                   [address] [place] [address]

 

                          Home Phone                [phone]

 

                          Email Address             [place]

 

                          Preferred Language        English

 

                          Marital Status            Unknown

 

                          Taoist Affiliation     Unknown

 

                          Race                      Unknown

 

                          Additional Race(s)        Unavailable



                                                    White

 

                          Ethnic Group              Unknown









Author







                          Organization              [facility]

t

 

                          Address                   [address]



                                                    [place] [address]

 

                          Phone                     [phone]









Support







                Name            Relationship    Address         Phone

 

                NOT OBTAINED    Unavailable     Unavailable     Unavailable

 

                NOT OBTAINED    Unavailable     Unavailable     Unavailable

 

                [place]   [place]              [address] [phone]



                                                [place] [address] 

 

                GOLDEN GRULLON  Spouse          Unavailable     Unavailable

 

                Golden Robertson  Spouse          Unavailable     [phone]

 

                Micah Grullon     Spouse          [address]    [phone]



                                                [place] [address] 









Care Team Providers







                    Name                Role                Phone

 

                    Mio Clark      Primary Care Physician [phone]

 

                    Fabrice Goldberg MD Attending Clinician [phone]

 

                    JEN SHANKAR  Attending Clinician Unavailable

 

                    FABRICE GOLDBERG Attending Clinician Unavailable

 

                    NATI WRAD Attending Clinician Unavailable

 

                    NATI WARD Admitting Clinician Unavailable









Payers







           Payer Name Policy Type Policy Number Effective Date Expiration Date JENS villalobos

 

           [employer] MED[employer] PPO            [id number] 2022            



                                            00:00:00              







Problems







       Condition Condition Condition Status Onset  Resolution Last   Treating Co

mments 

Source



       Name   Details Category        Date   Date   Treatment Clinician        



                                                 Date                 

 

       Syncope Syncope Disease Active 2022                             [facility]



       and    and                  2-20                               Health



       collapse collapse               00:00:                             



                                   00                                 

 

       Dizziness Dizziness Disease Active 2022                             [facility]



                                   2-20                               [facility]



                                   00:00:                             



                                   00                                 

 

       Stroke Stroke Disease Active                              [facility]



                                   [facility]                               [facility]



                                   [facility]



                                   [facility]                                 [facility]

 

       Hypertensi  Hypertens Problem Active               2022            

   [facility]



       ve     wang                                04:10:20               l



       disorder, disorder,                                                  [facility]

lamin



       systemic systemic                                                  



       arterial arterial                                                  



       (disorder) (disorder)                                                  



              Active                                                  



              Problem                                                  



              2022                                                  



              [facility]                                                  



              Neuro                                                   

 

       Paroxysmal  Paroxysma Problem Active               2022            

   [facility]



       atrial l atrial                             04:10:20               l



       fibrillati fibrillati                                                  [facility]

[facility]



       on     on                                                      



       (disorder) (disorder)                                                  



              Active                                                  



              Problem                                                  



              2022                                                  



              [facility]                                                  



              Neuro                                                   

 

       Malignant        Problem Resolve               2022               [facility]

[facility]



       tumor of Malignant        d                    04:10:20               l



       prostate tumor of                                                  [facility]

n



       (disorder) prostate                                                  



              (disorder)                                                  



              Resolved                                                  



              Problem                                                  



              2022                                                  



               [facility]                                                  



              Neuro                                                   

 

       History of  History Problem Active               2022              

 [facility]



       - CVA  of - CVA                             04:10:20               l



       (context-d (context-d                                                  He

rmann



       ependent ependent                                                  



       category) category)                                                  



              Active                                                  



              Problem                                                  



              2022                                                  



              [facility]                                                  



              Neuro                                                   

 

       Hyperlipid  Hyperlipi Problem Active               2022            

   [facility]



       emia   demia                              04:10:20               l



       (disorder) (disorder)                                                  He

rmann



              Active                                                  



              Problem                                                  



              2022                                                  



              [facility]                                                  



              Neuro                                                   







Allergies, Adverse Reactions, Alerts







       Allergy Allergy Status Severity Reaction(s) Onset  Inactive Treating Comm

ents 

Source



       Name   Type                        Date   Date   Clinician        

 

       Zolpidem Propensi Active               2022                      [facility]



              ty to                                               [facility]



              adverse                      00:00:                      



              reaction                      00                          



              s                                                       

 

       Zolpidem Drug   Active Severe                              [facility]



              Allergy                      [facility]-08                        [facility]



                                          [facility]                      [facility]



                                                                    [facility]







Social History







           Social Habit Start Date Stop Date  Quantity   Comments   Source

 

           Exposure to 2022-12-10 2022 Not sure              [facility]



           SARS-CoV-2 00:00:00   14:06:00                         



           (event)                                                

 

           Tobacco use and 2022 Smokeless tobacco            [facility]

 [facility]



           exposure   00:00:00   00:00:00   non-user              

 

           Social History 2022                       [facility]



                      16:30:33   16:30:33                         

 

           Alcohol intake 2017                       [facility]

es



                      00:00:00   00:00:00                         [facility]

 

           Sex Assigned At [birth year]                       [facility]

kes



           B[facility]      00:00:00   00:00:00                         [facility]









                Smoking Status  Start Date      Stop Date       Source

 

                Never smoker                                    [facility]







Medications







       Ordered Filled Start  Stop   Current Ordering Indication Dosage Frequency

 Signature

                    Comments            Components          Source



     Medication Medication Date Date Medication? Clinician                (SIG) 

          



     Name Name                                                   

 

     cetirizine            Yes            10mg      Take 10 mg           [facility]

T



     (ZyrTEC) 10      2-02                               by mouth.           [facility]

[facility]



     MG tablet      10:32:                                              



               25                                                

 

     cetirizine      2022      Yes            10mg      Take 10 mg           U

T



     (ZyrTEC) 10      2-20                               by mouth.           [facility]

[facility]



     MG tablet      14:29:                                              



               40                                                

 

     atorvastati      2022      Yes            40mg QD   Take 40 mg           

[facility]



     n (Lipitor)      1-21                               by mouth 1           [facility]

alth



     40 MG      00:00:                               (one) time           



     tablet      00                                 each day.           

 

     Xarelto 15      2022      Yes                      TAKE 1           [facility]



     MG tablet      1-21                               TABLET BY           [facility]

h



               00:00:                               MOUTH           



               00                                 DAILY IN           



                                                  THE            



                                                  EVENING           



                                                  WITH MEAL           

 

     atorvastati      2022      Yes            40mg QD   Take 40 mg           

[facility]



     n (Lipitor)      1-21                               by mouth 1           [facility]

alth



     40 MG      00:00:                               (one) time           



     tablet      00                                 each day.           

 

     Xarelto 15      2022      Yes                      TAKE 1           [facility]



     MG tablet      1-21                               TABLET BY           [facility]

h



               00:00:                               MOUTH           



               00                                 DAILY IN           



                                                  THE            



                                                  EVENING           



                                                  WITH MEAL           

 

     latanoprost      2022- No             1[drp]      Administer        

   [facility]



     (Xalatan)      0-31 12-20                          1 drop           Health



     0.005 %      00:00: 00:00                          into both           



     ophthalmic      00   :00                           eyes every           



     solution                                         night.           

 

     losartan-hy      2022      Yes            1{tbl}      Take 1           [facility]



     droCHLOROth      0-03                               tablet by           [facility]



     iazide      00:00:                               mouth 1           



     (Hyzaar)      00                                 (one) time           



     50-12.5 MG                                         each day           



     tablet                                         in the           



                                                  morning.           

 

     losartan-hy      2022      Yes            1{tbl}      Take 1           [facility]



     droCHLOROth      0-03                               tablet by           [facility]



     iazide      00:00:                               mouth 1           



     (Hyzaar)      00                                 (one) time           



     50-12.5 MG                                         each day           



     tablet                                         in the           



                                                  morning.           

 

     Advair            Yes            1{puff} Q.5D 1 puff in           [facility]



     Diskus      9-27                               the            Health



     250-50      00:00:                               morning           



     MCG/ACT      00                                 and 1 puff           



     discus                                         in the           



     inhaler                                         evening.           



                                                  INHALE 1           



                                                  PUFF BY           



                                                  MOUTH           



                                                  TWICE           



                                                  DAILY           



                                                  RINSE           



                                                  MOUTH WITH           



                                                  WATER           



                                                  AFTER USE.           

 

     Advair            Yes            1{puff} Q.5D 1 puff in           [facility]



     Diskus      9-27                               the            Health



     250-50      00:00:                               morning           



     MCG/ACT      00                                 and 1 puff           



     discus                                         in the           



     inhaler                                         evening.           



                                                  INHALE 1           



                                                  PUFF BY           



                                                  MOUTH           



                                                  TWICE           



                                                  DAILY           



                                                  RINSE           



                                                  MOUTH WITH           



                                                  WATER           



                                                  AFTER USE.           

 

     doxycycline      2022- No             100mg Q.5D Take 100           

[facility]



     (Monodox)      924 12-20                          mg by           Health



     100 MG      00:00: 00:00                          mouth in           



     capsule      00   :00                           the            



                                                  morning           



                                                  and 100 mg           



                                                  before           



                                                  bedtime.           

 

     albuterol            Yes            2{puff}      Inhale 2           U

T



     108 (90      9-15                               puffs           Health



     Base)      00:00:                               every 4           



     MCG/ACT      00                                 (four)           



     inhaler                                         hours if           



                                                  needed.           

 

     albuterol      2023- No             2{puff}      Inhale 2           

[facility]



     108 (90      9-15 02-02                          puffs           Health



     Base)      00:00: 00:00                          every 4           



     MCG/ACT      00   :00                           (four)           



     inhaler                                         hours if           



                                                  needed.           

 

     methylPREDN      2022- No                       follow           [facility]



     ISolone      915 12-20                          package           Health



     (Medrol      00:00: 00:00                          directions           



     [place]) 4      00   :00                                          



     MG tablets                                                        

 

     promethazin      2022- No                       TAKE 5 ML           

[facility]



     e-dextromet      15 12-20                          BY MOUTH           [facility]

[facility]



     horphan      00:00: 00:00                          EVERY 8           



     (Phenergan-      00   :00                           HOURS AS           



     DM) 6.25-15                                         NEEDED           



     MG/5ML                                                        



     syrup                                                        

 

     sotalol      2022- No             40mg QD   Take 40 mg           [facility]



     (Betapace)      910 12-20                          by mouth 1           He

alth



     80 MG      00:00: 00:00                          (one) time           



     tablet      00   :00                           each day.           

 

     sotalol 80            Yes                      0              [facility]



     mg oral      6-09                               Refill(s)           l



     tablet      16:23:                                              [facility]



               00                                                

 

     hydrochloro      0      Yes                      0              [facility]

a



     thiazide-lo      6-09                               Refill(s)           l



     sartan 12.5      16:23:                                              [facility]

n



     mg-50 mg      00                                                



     oral tablet                                                        

 

     latanoprost      0      Yes                      0              [facility]

a



     ophthalmic      6-09                               Refill(s)           l



     0.005%      16:23:                                              [facility]



     solution      00                                                

 

     Xarelto 20      0      Yes                      0              [facility]



     mg oral      6-09                               Refill(s)           l



     tablet      16:22:                                              [place]



               00                                                

 

     atorvastati      0      Yes                      0              [facility]

a



     n 40 mg      6-09                               Refill(s)           l



     oral tablet      16:22:                                              [facility]

n



               00                                                

 

     meclizine      2022- No                       TAKE 1           [facility]



     (Antivert)      4-07 12-20                          TABLET BY           [facility]

l[facility]



     25 MG      00:00: 00:00                          MOUTH           



     tablet      00   :00                           THREE           



                                                  TIMES           



                                                  DAILY AS           



                                                  NEEDED           



                                                  DIZZINESS           

 

     NIFEdipine      2022- No             30mg      Take 30 mg           

[facility]



     CC (Adalat      1- 12-20                          by mouth 1           [facility]

alth



     CC) 30 MG      00:00: 00:00                          (one) time           



     24 hr      00   :00                           each day           



     tablet                                         in the           



                                                  morning.           

 

     atorvastati            Yes            40mg QD   Take 40 mg           

[facility]



     n (LIPITOR)      3-10                               by mouth           [facility]

[facility]



     40 MG      18:34:                               daily.           Medical



     tablet                                                      [place]

 

     sotalol AF            Yes            40mg QD   Take 40 mg           [facility]

[facility]



     (BETAPACE      3-10                               by mouth           [facility]



     AF) 80 MG      18:34:                               daily.           [facility]

l



     tablet      [address] [place] [address]

 

     cetirizine            Yes            10mg QD   Take 10 mg           [facility]

[facility]



     (ZYRTEC) 10      3-10                               by mouth           [facility]

s



     MG tablet      18:34:                               daily.           [facility]

l



               [address] [place] [address]

 

     ascorbic            Yes            500mg QD   Take 500           [facility]



     acid,      3-10                               mg by           [facility]



     vitamin C,      18:34:                               mouth           [facility]

l



     (ASCORBIC      01                                 daily.           Center



     ACID WITH                                                        



     WILDA HIPS)                                                        



     500 MG                                                        



     tablet                                                        

 

     ascorbic            Yes            500mg QD   Take 500           [facility]



     acid,      3-10                               mg by           [facility]



     vitamin C,      18:34:                               mouth           [facility]

l



     (ASCORBIC      01                                 daily.           Center



     ACID WITH                                                        



     WILDA HIPS)                                                        



     500 MG                                                        



     tablet                                                        

 

     atorvastati            Yes            40mg QD   Take 40 mg           

[facility]



     n (LIPITOR)      3-10                               by mouth           [facility]

[facility]



     40 MG      18:34:                               daily.           Medical



     tablet      01                                                [place]

 

     sotalol AF            Yes            40mg QD   Take 40 mg           [facility]

[facility]



     (BETAPACE      3-10                               by mouth           [facility]



     AF) 80 MG      18:34:                               daily.           [facility]

l



     tablet      [address] [place] [address]

 

     cetirizine            Yes            10mg QD   Take 10 mg           [facility]

[facility]



     (ZYRTEC) 10      3-10                               by mouth           [facility]

s



     MG tablet      18:34:                               daily.           [facility]

l



               [address] [place] [address]

 

     rivaroxaban            Yes            20mg      Take 1           [facility]



     (XARELTO)      3-10                               tablet (20           [facility]

s



     20 mg Tab      00:00:                               mg total)           [facility]

ical



     tablet      00                                 by mouth           [facility]



                                                  daily with           



                                                  dinner.           

 

     rivaroxaban      2017-0      Yes            20mg      Take 1           [facility]



     (XARELTO)      3-10                               tablet (20           [facility]

s



     20 mg Tab      00:00:                               mg total)           [facility]

ical



     tablet      00                                 by mouth           Center



                                                  daily with           



                                                  dinner.           







Vital Signs







             Vital Name   Observation Time Observation Value Comments     Source

 

             Systolic blood 2023 16:31:00 136 mm[Hg]                [facility]



             pressure                                            

 

             Diastolic blood 2023 16:31:00 91 mm[Hg]                 [facility]

[facility]



             pressure                                            

 

             Heart rate   2023 16:31:00 67 /min                   [facility]

[facility]

 

             Body weight  2023 16:31:00 92.987 kg                 [facility]

[facility]

 

             Systolic blood 2022 20:26:00 166 mm[Hg]                [facility]



             pressure                                            

 

             Diastolic blood 2022 20:26:00 93 mm[Hg]                 [facility]

[facility]



             pressure                                            

 

             Heart rate   2022 20:26:00 62 /min                   [facility]

[facility]

 

             Body weight  2022 20:26:00 93.441 kg                 [facility]

[facility]

 

             Systolic (mm Hg) 2022 19:54:00                           [facility]

[facility]

 

             Diastolic (mm Hg) 2022 19:54:00                           [facility] [place]

 

             Heart Rate   2022 19:54:00                           [facility]

 [facility]

 

             Respitory Rate 2022 19:54:00                           [facility]

[facility]

 

             Height       2022 19:54:00 172.72 cm                 [facility]

 [place]

 

             Weight       2022 19:54:00                           [facility]

 [place]

 

             BMI Calculated 2022 19:54:00                           [facility]

[facility] [place]

 

             Systolic (mm Hg) 2022 16:21:00                           [facility]

[facility] [place]

 

             Diastolic (mm Hg) 2022 16:21:00                           [facility]

[facility]

 

             Heart Rate   2022 16:21:00                           [facility]

 [facility]

 

             Respitory Rate 2022 16:21:00                           [facility]

[facility] [place]

 

             Height       2022 16:21:00 173.99 cm                 [facility]

 [facility]

 

             Weight       2022 16:21:00                           [facility]

 [place]

 

             BMI Calculated 2022 16:21:00                           [facility]

[facility] [place]







Procedures







                Procedure       Date / Time Performed Performing Clinician [facility]

steven

 

                ECG 12-LEAD     2023 16:32:00 HematpoFabrice granados [facility]

 

                ECG 12-LEAD     2022 20:31:00 HematpoFabrice granados [facility]

 

                Operation on heart                                 [facility]







Encounters







        Start   End     Encounter Admission Attending Care    Care    Encounter 

Source



        Date/Time Date/Time Type    Type    Clinicians Facility Department ID   

   

 

        2023         Outpatient                 [facility]     [id number]

 [place]



        13:28:38                                                 [id number] [facility]

 

        2023         Outpatient                 [facility]     [id number]

 [facility]



        13:11:05                                                 [id number] [facility]

 

        2022         Outpatient                 [facility]     [id number]

 [facility]



        14:02:01                                                 [id number] [facility]

 

        2022         Outpatient                 [facility]     [id number]

 [facility]



        14:57:16                                                 [id number] [facility]

 

        2023 Office          Hematporoberta, CHRISTINA     [id number]

[id number] [facility]



        11:00:00 11:00:00 Visit           Fabrice KOHLER [id number]         

Health



                                                CLINIC  [id number]         



                                                        [id number]         



                                                        1               

 

        2023 Outpatient         [facility]     [id number]

[id number] [place]



        13:30:00 13:30:00                 JEN                         Mik

stefany

 

        2023 Outpatient         [facility], [facility]    CAR     [id number]

    [facility]



        09:09:00 09:45:00                 SEANASHAYAR                         

 

        2023 Outpatient         [facility], [facility]     [id number]

[id number] [place]



        10:00:00 10:00:00                 DEWEYR                         [facility]



 

        2022 Office          [facility]     [id number]

[id number] [facility]



        14:30:00 15:04:54 Visit           Fabrice KOHLER [id number]         

Health



                                                CLINIC  [id number]         



                                                        [id number]         



                                                        1               

 

        2022 Outpatient                 [facility]     [id number]

[address] [facility]



        19:45:00 04:59:59                         r       Neurology 01      l



                                                        David Nguyễn

 

        2022 2022-06-10 Outpatient                 [facility]     [id number]

[address] [facility]



        16:30:00 04:59:59                         r       Neurology 00      l



                                                        David Fongann







Results







           Test Description Test Time  Test Comments Results    Result Comments 

Source









                    ECG 12 lead         2023 16:32:00 









                      Test Item  Value      Reference Range Interpretation Comme

nts









             Lab Interpretation (test code = 96925-5) Abnormal                  

             



[facility] 12 [id number] 20:31:00





             Test Item    Value        Reference Range Interpretation Comments

 

             Lab Interpretation (test code = Abnormal                           

    



             54726-0)                                            



[facility]2017-03-10 17:06:00





             Test Item    Value        Reference Range Interpretation Comments

 

             POC-GLUCOSE METER 86 mg/dL                         TESTED AT 

[place] [address]



             ([facility]) (test code =                                        [facility] [address]



             1538)                                               



POCT-GLUCOSE METER2017-03-10 12:26:00





             Test Item    Value        Reference Range Interpretation Comments

 

             POC-GLUCOSE METER 86 mg/dL                         TESTED AT 

[place] [address]



             ([facility] (test code =                                        [facility] [address]



             1538)                                               



CBC W/PLT COUNT &amp; AUTO DIFFERENTIAL2017-03-10 08:00:00





             Test Item    Value        Reference Range Interpretation Comments

 

             WHITE BLOOD CELL COUNT (BEAKER) 9.4 K/ L     4.0-10.0              

    



             (test code = 775)                                        

 

             RED BLOOD CELL COUNT (AKER) 4.32 M/ L    4.20-5.80               

  



             (test code = 761)                                        

 

             HEMOGLOBIN (BEAKER) (test code = 15.1 GM/DL   13.0-16.8            

     



             410)                                                

 

             HEMATOCRIT (BEAKER) (test code = 43.3 %       40.0-50.0            

     



             411)                                                

 

             MEAN CORPUSCULAR VOLUME (BEAKER) 100.0 fL     82.0-98.0    H       

     



             (test code = 753)                                        

 

             MEAN CORPUSCULAR HEMOGLOBIN 34.8 pg      27.0-33.0    H            



             (BEAKER) (test code = 751)                                        

 

             MEAN CORPUSCULAR HEMOGLOBIN CONC 34.7 GM/DL   32.0-36.0            

     



             (BEAKER) (test code = 752)                                        

 

             RED CELL DISTRIBUTION WIDTH 12.0 %       10.3-14.2                 



             (BEAKER) (test code = 412)                                        

 

             PLATELET COUNT (BEAKER) (test 138 K/CU MM  150-430      L          

  



             code = 756)                                         

 

             MEAN PLATELET VOLUME (BEAKER) 8.3 fL       6.5-10.5                

  



             (test code = 754)                                        

 

             NUCLEATED RED BLOOD CELLS 0 /100 WBC   0-0                       



             (BEAKER) (test code = 413)                                        

 

             NEUTROPHILS RELATIVE PERCENT 58 %                                  

 



             (BEAKER) (test code = 429)                                        

 

             LYMPHOCYTES RELATIVE PERCENT 31 %                                  

 



             (BEAKER) (test code = 430)                                        

 

             MONOCYTES RELATIVE PERCENT 8 %                                    



             (BEAKER) (test code = 431)                                        

 

             EOSINOPHILS RELATIVE PERCENT 2 %                                   

 



             (BEAKER) (test code = 432)                                        

 

             BASOPHILS RELATIVE PERCENT 1 %                                    



             (BEAKER) (test code = 437)                                        

 

             NEUTROPHILS ABSOLUTE COUNT 5.44 K/ L    1.80-8.00                 



             (BEAKER) (test code = 670)                                        

 

             LYMPHOCYTES ABSOLUTE COUNT 2.94 K/ L    1.48-4.50                 



             (BEAKER) (test code = 414)                                        

 

             MONOCYTES ABSOLUTE COUNT (BEAKER) 0.73 K/ L    0.00-1.30           

      



             (test code = 415)                                        

 

             EOSINOPHILS ABSOLUTE COUNT 0.22 K/ L    0.00-0.50                 



             (BEAKER) (test code = 416)                                        

 

             BASOPHILS ABSOLUTE COUNT (BEAKER) 0.06 K/ L    0.00-0.20           

      



             (test code = 417)                                        



0.00POCT-GLUCOSE [id number] 21:27:00





             Test Item    Value        Reference Range Interpretation Comments

 

             POC-GLUCOSE METER 84 mg/dL                         TESTED AT 

[place] [address]



             ([facility]) (test code =                                        [facility] [address]



             1538)                                               



[id number] 14:57:00





             Test Item    Value        Reference Range Interpretation Comments

 

             RPR SCREEN ([facility]) (test code = Nonreactive  Nonreactive          

     



             420)                                                



POCT-GLUCOSE [id number] 12:20:00





             Test Item    Value        Reference Range Interpretation Comments

 

             POC-GLUCOSE METER 141 mg/dL           H            TESTED AT 

[place] [address]



             ([facility]) (test code =                                        [facility]



             1538)                                               [address]



POCT-GLUCOSE [id number] 08:06:00





             Test Item    Value        Reference Range Interpretation Comments

 

             POC-GLUCOSE METER 86 mg/dL                         TESTED AT 

[place] [address]



             ([facility]) (test code =                                        [facility] [address]



             1538)                                               



CBC W/PLT COUNT &amp; AUTO [id number] 05:34:00





             Test Item    Value        Reference Range Interpretation Comments

 

             WHITE BLOOD CELL COUNT (BEAKER) 6.7 K/ L     4.0-10.0              

    



             (test code = 775)                                        

 

             RED BLOOD CELL COUNT (BEAKER) 3.94 M/ L    4.20-5.80    L          

  



             (test code = 761)                                        

 

             HEMOGLOBIN (BEAKER) (test code = 14.0 GM/DL   13.0-16.8            

     



             410)                                                

 

             HEMATOCRIT (BEAKER) (test code = 39.2 %       40.0-50.0    L       

     



             411)                                                

 

             MEAN CORPUSCULAR VOLUME (BEAKER) 99.6 fL      82.0-98.0    H       

     



             (test code = 753)                                        

 

             MEAN CORPUSCULAR HEMOGLOBIN 35.5 pg      27.0-33.0    H            



             (BEAKER) (test code = 751)                                        

 

             MEAN CORPUSCULAR HEMOGLOBIN CONC 35.6 GM/DL   32.0-36.0            

     



             (BEAKER) (test code = 752)                                        

 

             RED CELL DISTRIBUTION WIDTH 11.8 %       10.3-14.2                 



             (BEAKER) (test code = 412)                                        

 

             PLATELET COUNT (BEAKER) (test 129 K/CU MM  150-430      L          

  



             code = 756)                                         

 

             MEAN PLATELET VOLUME (BEAKER) 7.9 fL       6.5-10.5                

  



             (test code = 754)                                        

 

             NUCLEATED RED BLOOD CELLS 0 /100 WBC   0-0                       



             (BEAKER) (test code = 413)                                        

 

             NEUTROPHILS RELATIVE PERCENT 49 %                                  

 



             (BEAKER) (test code = 429)                                        

 

             LYMPHOCYTES RELATIVE PERCENT 38 %                                  

 



             (BEAKER) (test code = 430)                                        

 

             MONOCYTES RELATIVE PERCENT 8 %                                    



             (BEAKER) (test code = 431)                                        

 

             EOSINOPHILS RELATIVE PERCENT 4 %                                   

 



             (BEAKER) (test code = 432)                                        

 

             BASOPHILS RELATIVE PERCENT 1 %                                    



             (BEAKER) (test code = 437)                                        

 

             NEUTROPHILS ABSOLUTE COUNT 3.24 K/ L    1.80-8.00                 



             (BEAKER) (test code = 670)                                        

 

             LYMPHOCYTES ABSOLUTE COUNT 2.55 K/ L    1.48-4.50                 



             (BEAKER) (test code = 414)                                        

 

             MONOCYTES ABSOLUTE COUNT (BEAKER) 0.53 K/ L    0.00-1.30           

      



             (test code = 415)                                        

 

             EOSINOPHILS ABSOLUTE COUNT 0.27 K/ L    0.00-0.50                 



             (BEAKER) (test code = 416)                                        

 

             BASOPHILS ABSOLUTE COUNT (BEAKER) 0.07 K/ L    0.00-0.20           

      



             (test code = 417)                                        



0.00BASIC METABOLIC [id number] 05:17:00





             Test Item    Value        Reference Range Interpretation Comments

 

             SODIUM (BEAKER) 143 meq/L    136-145                   



             (test code = 381)                                        

 

             POTASSIUM (BEAKER) 3.6 meq/L    3.5-5.1                   



             (test code = 379)                                        

 

             CHLORIDE (BEAKER) 116 meq/L           H            



             (test code = 382)                                        

 

             CO2 (BEAKER) (test 22 meq/L     22-29                     



             code = 355)                                         

 

             BLOOD UREA NITROGEN 20 mg/dL     7-21                      



             (BEAKER) (test code                                        



             = 354)                                              

 

             CREATININE (BEAKER) 1.10 mg/dL   0.57-1.25                 



             (test code = 358)                                        

 

             GLUCOSE RANDOM 88 mg/dL                         



             (BEAKER) (test code                                        



             = 652)                                              

 

             CALCIUM (BEAKER) 7.3 mg/dL    8.4-10.2     L            



             (test code = 697)                                        

 

             EGFR (BEAKER) (test 64 mL/min/1.73                           ESTIMA

GLEN GFR IS



             code = 1092) sq m                                   NOT AS ACCURATE

 AS



                                                                 CREATININE



                                                                 CLEARANCE IN



                                                                 PREDICTING



                                                                 GLOMERULAR



                                                                 FILTRATION RATE

.



                                                                 ESTIMATED GFR I

S



                                                                 NOT APPLICABLE 

FOR



                                                                 DIALYSIS PATIEN

TS.



FastingSpecimen slightly ictericLIPID [id number] 05:13:00





             Test Item    Value        Reference Range Interpretation Comments

 

             TRIGLYCERIDES (BEAKER) (test code = 78 mg/dL                       

        



             540)                                                

 

             CHOLESTEROL (BEAKER) (test code = 80 mg/dL                         

      



             631)                                                

 

             HDL CHOLESTEROL (BEAKER) (test code 21 mg/dL                       

        



             = 976)                                              

 

             LDL CHOLESTEROL CALCULATED (BEAKER) 43 mg/dL                       

        



             (test code = 633)                                        



Triglyceride Reference Range: Low Risk &lt;150 Borderline 150-199 High Risk 200-
499 Very High Risk &gt;=500Cholesterol Reference Range: Low Risk &lt;200 
Borderline 200-239 High Risk &gt;240HDL Cholesterol Reference Range: Low Risk 
&gt;=60 High Risk &lt;40LDL Cholesterol Reference Range: Optimal &lt;100 Near 
Optimal 100-129 Borderline 130-159  High 160-189 Very High &gt;=190 
FastingSpecimen slightly [id number] 05:13:00





             Test Item    Value        Reference Range Interpretation Comments

 

             PHOSPHORUS (BEAKER) (test code = 3.0 mg/dL    2.3-4.7              

     



             604)                                                



[id number] 05:13:00





             Test Item    Value        Reference Range Interpretation Comments

 

             MAGNESIUM (BEAKER) (test code = 1.6 mg/dL    1.6-2.6               

    



             627)                                                



FastingPOCT-GLUCOSE [id number] 22:21:00





             Test Item    Value        Reference Range Interpretation Comments

 

             POC-GLUCOSE METER 91 mg/dL                         TESTED AT 

[facility] [address]



             (BEAKER) (test code =                                        [place] [address]



             1538)                                               



HEMOGLOBIN [id number] 21:17:00





             Test Item    Value        Reference Range Interpretation Comments

 

             HEMOGLOBIN A1C (BEAKER) (test code = 5.0 %        4.3-6.1          

         



             368)                                                



VITAMIN B12 AND [id number] 21:00:00





             Test Item    Value        Reference Range Interpretation Comments

 

             VITAMIN B12 (BEAKER) (test code = 433 pg/mL    213-816             

      



             774)                                                

 

             FOLATE (BEAKER) (test code = 362) > ng/mL      >=7.0               

      



Effective 2014: Folate Reference Range ChangeNew: &gt;=7.0 Previous: 
&gt;=5.4PROTHROMBIN TIME/[id number] 19:14:00





             Test Item    Value        Reference Range Interpretation Comments

 

             PROTIME (BEAKER) (test code = 14.2 seconds 11.7-14.7               

  



             759)                                                

 

             INR (BEAKER) (test code = 370) 1.1          <=5.9                  

   



RECOMMENDED COUMADIN/WARFARIN INR THERAPY RANGESSTANDARD DOSE: 2.0 - 3.0 
Includes: PROPHYLAXIS for venous thrombosis, systemic embolization; TREATMENT 
for venous thrombosis and/or pulmonary embolus.HIGH RISK: Target INR is 2.5-3.5 
for patients with mechanical heart valves.[id number] 19:14:00





             Test Item    Value        Reference Range Interpretation Comments

 

             PARTIAL THROMBOPLASTIN TIME 36.0 seconds 22.5-36.0                 



             (BEAKER) (test code = 760)                                        



BASIC METABOLIC [id number] 19:09:00





             Test Item    Value        Reference Range Interpretation Comments

 

             SODIUM (BEAKER) 140 meq/L    136-145                   



             (test code = 381)                                        

 

             POTASSIUM (BEAKER) 4.2 meq/L    3.5-5.1                   



             (test code = 379)                                        

 

             CHLORIDE (BEAKER) 109 meq/L           H            



             (test code = 382)                                        

 

             CO2 (BEAKER) (test 21 meq/L     22-29        L            



             code = 355)                                         

 

             BLOOD UREA NITROGEN 21 mg/dL     7-21                      



             (BEAKER) (test code                                        



             = 354)                                              

 

             CREATININE (BEAKER) 1.41 mg/dL   0.57-1.25    H            



             (test code = 358)                                        

 

             GLUCOSE RANDOM 90 mg/dL                         



             (BEAKER) (test code                                        



             = 652)                                              

 

             CALCIUM (BEAKER) 9.1 mg/dL    8.4-10.2                  



             (test code = 697)                                        

 

             EGFR (BEAKER) (test 48 mL/min/1.73                           ESTIMA

GLEN GFR IS



             code = 1092) sq m                                   NOT AS ACCURATE

 AS



                                                                 CREATININE



                                                                 CLEARANCE IN



                                                                 PREDICTING



                                                                 GLOMERULAR



                                                                 FILTRATION RATE

.



                                                                 ESTIMATED GFR I

S



                                                                 NOT APPLICABLE 

FOR



                                                                 DIALYSIS PATIEN

TS.



Specimen slightly ictericCBC W/PLT COUNT &amp; AUTO [id number] 
19:00:00





             Test Item    Value        Reference Range Interpretation Comments

 

             WHITE BLOOD CELL COUNT (BEAKER) 9.5 K/ L     4.0-10.0              

    



             (test code = 775)                                        

 

             RED BLOOD CELL COUNT (BEAKER) 4.91 M/ L    4.20-5.80               

  



             (test code = 761)                                        

 

             HEMOGLOBIN (BEAKER) (test code = 16.3 GM/DL   13.0-16.8            

     



             410)                                                

 

             HEMATOCRIT (BEAKER) (test code = 47.7 %       40.0-50.0            

     



             411)                                                

 

             MEAN CORPUSCULAR VOLUME (BEAKER) 97.2 fL      82.0-98.0            

     



             (test code = 753)                                        

 

             MEAN CORPUSCULAR HEMOGLOBIN 33.2 pg      27.0-33.0    H            



             (BEAKER) (test code = 751)                                        

 

             MEAN CORPUSCULAR HEMOGLOBIN CONC 34.2 GM/DL   32.0-36.0            

     



             (BEAKER) (test code = 752)                                        

 

             RED CELL DISTRIBUTION WIDTH 13.1 %       10.3-14.2                 



             (BEAKER) (test code = 412)                                        

 

             PLATELET COUNT (BEAKER) (test 148 K/CU MM  150-430      L          

  



             code = 756)                                         

 

             MEAN PLATELET VOLUME (BEAKER) 7.7 fL       6.5-10.5                

  



             (test code = 754)                                        

 

             NUCLEATED RED BLOOD CELLS 0 /100 WBC   0-0                       



             (BEAKER) (test code = 413)                                        

 

             NEUTROPHILS RELATIVE PERCENT 54 %                                  

 



             (BEAKER) (test code = 429)                                        

 

             LYMPHOCYTES RELATIVE PERCENT 35 %                                  

 



             (BEAKER) (test code = 430)                                        

 

             MONOCYTES RELATIVE PERCENT 7 %                                    



             (BEAKER) (test code = 431)                                        

 

             EOSINOPHILS RELATIVE PERCENT 3 %                                   

 



             (BEAKER) (test code = 432)                                        

 

             BASOPHILS RELATIVE PERCENT 1 %                                    



             (BEAKER) (test code = 437)                                        

 

             NEUTROPHILS ABSOLUTE COUNT 5.11 K/ L    1.80-8.00                 



             (BEAKER) (test code = 670)                                        

 

             LYMPHOCYTES ABSOLUTE COUNT 3.35 K/ L    1.48-4.50                 



             (BEAKER) (test code = 414)                                        

 

             MONOCYTES ABSOLUTE COUNT (BEAKER) 0.66 K/ L    0.00-1.30           

      



             (test code = 415)                                        

 

             EOSINOPHILS ABSOLUTE COUNT 0.32 K/ L    0.00-0.50                 



             (BEAKER) (test code = 416)                                        

 

             BASOPHILS ABSOLUTE COUNT (BEAKER) 0.08 K/ L    0.00-0.20           

      



             (test code = 417)                                        



0.00

## 2023-02-11 NOTE — ER
Nurse's Notes                                                                                     

 [facility]                                                                 

Name: Ap Orr                                                                                   

Age: 88 yrs                                                                                       

Sex: Male                                                                                         

: 1934                                                                                   

MRN: [id number]                                                                                   

Arrival Date: 2023                                                                          

Time: 17:56                                                                                       

Account#: [id number]                                                                            

Bed 3                                                                                             

Private MD: Mio Clark                                                                          

Diagnosis: Hypotension, unspecified;Altered mental status, unspecified                            

                                                                                                  

Presentation:                                                                                     

                                                                                             

18:36 Chief complaint: Patient states: L ear pain x 2-3 days. Disorientation that began this  ss  

      morning. Pt c/o bladder issues as well, and states that he has chronic issues, but          

      they've gotten worse recently. Coronavirus screen: Client denies travel out of the [place]     

      in the last 14 days. Ebola Screen: Patient denies exposure to infectious person.            

      Patient denies travel to an Ebola-affected area in the 21 days before illness onset.        

      Initial Sepsis Screen: Does the patient meet any 2 criteria? No. Patient's initial          

      sepsis screen is negative. Does the patient have a suspected source of infection? No.       

      Patient's initial sepsis screen is negative. Risk Assessment: Do you want to hurt           

      yourself or someone else? Patient reports no desire to harm self or others. Onset of        

      symptoms is unknown.                                                                        

18:36 Method Of Arrival: Ambulatory                                                           ss  

18:36 Acuity: ELIZABETH 2                                                                           ss  

                                                                                                  

Historical:                                                                                       

- Allergies:                                                                                      

18:40 ambien (agitation);                                                                     ss  

- PMHx:                                                                                           

18:40 CAD; Hyperlipidemia; Hypertension; Prostate Cancer;                                     ss  

                                                                                                  

- Immunization history:: Client reports receiving the 2nd dose of the Covid vaccine.              

- Social history:: Smoking status: Patient denies any tobacco usage or history of.                

                                                                                                  

                                                                                                  

Screenin:00 [facility] ED Fall Risk Assessment (Adult) History of falling in the last 3 months,       jb4 

      including since admission No falls in past 3 months (0 pts) Confusion or Disorientation     

      No (0 pts) Intoxicated or Sedated No (0 pts) Impaired Gait Yes (1 pt) Mobility Assist       

      Device Used No (0 pt) Altered Elimination Yes (1 pt) Score/Fall Risk Level 3 or more        

      points = High Risk Oriented to surroundings, Maintained a safe environment, Educated pt     

      \T\ family on fall prevention, incl call for assistance when getting out of bed, Assessed   

      \T\ reinforced patient's understanding of fall precautions, Hourly rounding (assess needs   

      \T\ fall precautionary measures) done, Used ambulatory aids as needed (educated on \T\      

      assisted with), Used gait belt as appropriate Implemented a Fall Risk Plan of Care,         

      Offered frequent toileting (1:1 observation), Remained with patient while ambulating.       

19:00 Abuse screen: Denies threats or abuse. Nutritional screening: No deficits noted.        jb4 

      Tuberculosis screening: No symptoms or risk factors identified.                             

                                                                                                  

Assessment:                                                                                       

19:00 General: Appears in no apparent distress. comfortable, Behavior is calm, cooperative,   jb4 

      appropriate for age. Pain: Pain: Complains of pain in left ear Pain does not radiate.       

      Pain currently is 4 out of 10 on a pain scale. Neuro: Level of Consciousness is awake,      

      alert, obeys commands, Oriented to person, place, time, situation, Speech is normal,        

      Facial symmetry appears normal, Pupils are PERRLA, Reports dizziness, when standing.        

      Cardiovascular: Patient's skin is warm and dry. Respiratory: Airway is patent               

      Respiratory effort is even, unlabored, Respiratory pattern is regular, symmetrical. GI:     

      No signs and/or symptoms were reported involving the gastrointestinal system. : No        

      signs and/or symptoms were reported regarding the genitourinary system. EENT: Ear canal     

      clear on left ear and right ear. Derm: Skin is intact, Skin is pink, warm \T\ dry.          

      Musculoskeletal: Circulation, motion, and sensation intact. Range of motion: intact in      

      all extremities.                                                                            

20:00 Reassessment: Patient appears in no apparent distress at this time. Patient and/or      jb4 

      family updated on plan of care and expected duration. Pain level reassessed. Patient is     

      alert, oriented x 3, equal unlabored respirations, skin warm/dry/pink.                      

21:00 Reassessment: Patient appears in no apparent distress at this time. Patient and/or      jb4 

      family updated on plan of care and expected duration. Pain level reassessed. Patient is     

      alert, oriented x 3, equal unlabored respirations, skin warm/dry/pink.                      

22:00 Reassessment: Patient appears in no apparent distress at this time. Patient and/or      jb4 

      family updated on plan of care and expected duration. Pain level reassessed. Patient is     

      alert, oriented x 3, equal unlabored respirations, skin warm/dry/pink.                      

23:30 Reassessment: Patient appears in no apparent distress at this time. Patient and/or      jb4 

      family updated on plan of care and expected duration. Pain level reassessed. Patient is     

      alert, oriented x 3, equal unlabored respirations, skin warm/dry/pink.                      

                                                                                                  

Vital Signs:                                                                                      

18:36 Resp 17; Temp 99.0(TE); Weight 92.99 kg; Height 5 ft. 11 in. (180.34 cm);               ss  

18:40 Pulse 86; Pulse Ox 94% on R/A;                                                          ss  

18:42 BP 74 / 68;                                                                             ss  

18:42 BP 83 / 59;                                                                             ss  

18:48 BP 90 / 61; Pulse 72; Resp 16; Pulse Ox 95% on R/A;                                     hb  

20:00 BP 78 / 59; Pulse 71; Resp 19; Pulse Ox 95% on R/A;                                     jb4 

20:15 BP 91 / 67; Pulse 70; Resp 13; Pulse Ox 100% on R/A;                                    jb4 

20:15 BP 87 / 62; Pulse 70; Resp 16; Pulse Ox 97% on R/A;                                     jb4 

21:15 BP 87 / 62; Pulse 70; Resp 15; Pulse Ox 98% on R/A;                                     jb4 

22:00 BP 94 / 66; Pulse 65; Resp 17; Pulse Ox 98% on R/A;                                     jb4 

23:00  / 71; Pulse 80; Resp 16; Pulse Ox 99% on R/A;                                    jb4 

18:36 Body Mass Index 28.59 (92.99 kg, 180.34 cm)                                             ss  

20:00 physician notified                                                                      jb4 

                                                                                                  

ED Course:                                                                                        

17:56 Patient arrived in ED.                                                                  rg4 

17:56 Mio Clark MD is Private Physician.                                                  rg4 

18:40 Triage completed.                                                                       ss  

18:40 Arm band placed on right wrist.                                                         ss  

19:03 Gerard Mnotaño DO is Attending Physician.                                                ms3 

19:11 CT Head Brain wo Cont In Process Unspecified.                                           EDMS

19:45 XRAY Chest (1 view) In Process Unspecified.                                             EDMS

19:51 Ike Cardenas, RN is Primary Nurse.                                                     jb4 

19:51 Troponin HS Sent.                                                                       jb4 

19:51 Magnesium Sent.                                                                         jb4 

19:51 LFT's Sent.                                                                             jb4 

19:51 CBC with Diff Sent.                                                                     jb4 

19:51 Basic Metabolic Panel Sent.                                                             jb4 

20:24 Ptt, Activated Sent.                                                                    jb4 

20:24 Protime (+inr) Sent.                                                                    jb4 

20:24 Blood Culture Adult (2) Sent.                                                           jb4 

20:24 Lactate w/ 2H reflex if indic. Sent.                                                    jb4 

22:23 Mio Clark MD is Hospitalizing Provider.                                             ms3 

23:30 Patient has correct armband on for positive identification. Placed in gown. Bed in low  jb4 

      position. Call light in reach. Side rails up X 1. Client placed on continuous cardiac       

      and pulse oximetry monitoring. NIBP monitoring applied. Cardiac monitor on.                 

23:30 No provider procedures requiring assistance completed. Patient admitted, IV remains in  jb4 

      place.                                                                                      

                                                                                                  

Administered Medications:                                                                         

19:23 Not Given (Physician Discretion): Rocephin (cefTRIAXone) 1 grams IV at per protocol     jb4 

      once; Given slow IV push per pharmacy instructions                                          

19:23 Not Given (Physician Discretion): NS 0.9% 1000 ml IV at 1 bolus Per protocol; 1000 mL   jb4 

      bolus                                                                                       

19:51 Drug: NS 0.9% 1000 ml Route: IV; Rate: 125 ml/hr; Site: right antecubital;              jb4 

20:07 Follow up: Rate change bolus; Changed per physicians instructions.                      jb4 

20:45 Follow up: Response: No adverse reaction; IV Status: Completed infusion; IV Intake:     jb4 

      1000ml                                                                                      

20:30 Drug: Rocephin (cefTRIAXone) 1 grams Route: IV; Rate: calculated rate; Site: right      [facility] 

      antecubital;                                                                                

23:11 Follow up: Response: No adverse reaction; IV Status: Completed infusion                 jb4 

21:45 Drug: Lactated Ringers Solution 1000 ml Route: IV; Rate: 125 ml/hr; Site: right         jb4 

      antecubital;                                                                                

23:54 Follow up: IV Status: Infusion continued upon admission                                 jb4 

22:50 Drug: Lactated Ringers Solution 500 ml Route: IV; Rate: bolus; Site: right antecubital; jb4 

23:20 Follow up: Response: No adverse reaction; Marked relief of symptoms; IV Status:         jb4 

      Completed infusion; IV Intake: 500ml                                                        

                                                                                                  

                                                                                                  

Medication:                                                                                       

23:30 VIS not applicable for this client.                                                     jb4 

                                                                                                  

Intake:                                                                                           

20:45 IV: 1000ml; Total: 1000ml.                                                              jb4 

23:20 IV: 500ml; Total: 1500ml.                                                               jb4 

                                                                                                  

Outcome:                                                                                          

22:23 Decision to Hospitalize by Provider.                                                    ms3 

23:30 Admitted to Tele accompanied by nurse, via stretcher, room 427, with chart, Report      jb4 

      called to  PRAMOD Foster                                                                         

23:30 Condition: stable                                                                           

23:30 Discharge instructions given to patient, family, Instructed on the need for admit,          

      Demonstrated understanding of instructions.                                                 

23:55 Patient left the ED.                                                                    jb4 

                                                                                                  

Signatures:                                                                                       

Dispatcher [employer]                           Gloria Rocha RN RN ss Baxter, Heather, RN RN hb Garcia, Rubi                                 rg4                                                  

Ike Cardenas RN RN   jb4                                                  

Gerard Montaño DO DO   ms3                                                  

                                                                                                  

Corrections: (The following items were deleted from the chart)                                    

18:42 18:36 Acuity: ELIZABETH 3 ss                                                                  ss  

21:21 19:00 Pain: jb4                                                                         jb4 

                                                                                                  

**************************************************************************************************

## 2023-02-11 NOTE — RAD REPORT
EXAM DESCRIPTION:  CT - Head Brain Wo Cont - 2/11/2023 7:10 pm

 

CLINICAL HISTORY:  Dizziness

 

COMPARISON:  <Comparisons>

 

TECHNIQUE:  All CT scans are performed using dose optimization technique as appropriate and may inclu
de automated exposure control or mA/KV adjustment according to patient size.

 

FINDINGS:  No intracranial hemorrhage, hydrocephalus or extra-axial fluid collection.No areas of brai
n edema or evidence of midline shift.

 

The paranasal sinuses and mastoids are clear. The calvarium is intact.

 

IMPRESSION:  No acute intracranial abnormality.

## 2023-02-11 NOTE — RAD REPORT
EXAM DESCRIPTION:  RAD - Chest Single View - 2/11/2023 7:43 pm

 

CLINICAL HISTORY:  COUGH

 

COMPARISON:  Chest Single View dated 9/22/2022; Chest Pa And Lat (2 Views) dated 12/14/2021; Chest Si
ngle View dated 10/12/2018; Chest Single View dated 3/8/2017

 

FINDINGS:  Lines: None.

Lungs: No evidence of edema or pneumonia. Prominent interstitial markings in the medial right lung ba
se is unchanged.

Pleural: No significant pleural effusions or pneumothorax.

Cardiac: The heart size is within normal limits.

Mediastinum: Within normal limits.

Bones: No acute fractures.

Other: Sternotomy. Pacemaker.

 

IMPRESSION:  No acute cardiopulmonary disease.

## 2023-02-11 NOTE — EDPHYS
Physician Documentation                                                                           

 [facility]                                                                 

Name: Ap Orr                                                                                   

Age: 88 yrs                                                                                       

Sex: Male                                                                                         

: 1934                                                                                   

MRN: [id number]                                                                                   

Arrival Date: 2023                                                                          

Time: 17:56                                                                                       

Account#: [id number]                                                                            

Bed 3                                                                                             

Private MD: Mio Clark                                                                          

ED Physician Gerard Montaño                                                                         

HPI:                                                                                              

                                                                                             

20:07 This 88 yrs old Male presents to ER via Ambulatory with complaints of Disoriented, Ear  ms3 

      Pain.                                                                                       

20:07 88-year-old male with past medical history of coronary artery disease, hyperlipidemia,  ms3 

      hypertension, prostate cancer presents for disorientation that began this afternoon.        

      Patient states he was tired and took a nap. On waking up from his nap midday today he       

      began his morning routine. Patient states he went to get his newspaper and when he went     

      outside the paper was not there. His wife then informed him he had already gotten the       

      newspaper earlier in the morning. Patient also notes he has had left ear pain that has      

      been sharp for the last 2 to 3 days. Patient notes he has had urinary incontinence          

      since he received treatment for his prostate cancer. Patient endorses frequency.            

      Patient denies dysuria..                                                                    

                                                                                                  

Historical:                                                                                       

- Allergies:                                                                                      

18:40 ambien (agitation);                                                                     ss  

- PMHx:                                                                                           

18:40 CAD; Hyperlipidemia; Hypertension; Prostate Cancer;                                     ss  

                                                                                                  

- Immunization history:: Client reports receiving the 2nd dose of the Covid vaccine.              

- Social history:: Smoking status: Patient denies any tobacco usage or history of.                

                                                                                                  

                                                                                                  

ROS:                                                                                              

20:07 Constitutional: Negative for fever, and chills. Neck: Negative for injury, pain, and    ms3 

      swelling, Cardiovascular: Negative for chest pain, and palpitations. Respiratory:           

      Negative for shortness of breath, cough, wheezing, and pleuritic chest pain,                

      Abdomen/GI: Negative for abdominal pain, nausea, vomiting, diarrhea, and constipation,      

      MS/Extremity: Negative for injury and deformity.                                            

20:07 : Positive for urinary frequency, bladder incontinence                                    

20:07 All other systems are negative.                                                             

                                                                                                  

Exam:                                                                                             

20:07 Constitutional:  This is a well developed, well nourished patient who is awake, alert,  ms3 

      and in no acute distress. Head/Face:  Normocephalic, atraumatic. Neck:  Trachea             

      midline, no cervical lymphadenopathy.  Supple, full range of motion without nuchal          

      rigidity, or vertebral point tenderness.  No Meningismus. Chest/axilla:  Normal chest       

      wall appearance and motion.  Nontender with no deformity.   Cardiovascular:  Regular        

      rate and rhythm with a normal S1 and S2.  No gallops, murmurs, or rubs.  Normal PMI, no     

      JVD.  No pulse deficits. Respiratory:  Lungs have equal breath sounds bilaterally,          

      clear to auscultation and percussion.  No rales, rhonchi or wheezes noted.  No              

      increased work of breathing, no retractions or nasal flaring. Abdomen/GI:  Soft,            

      non-tender, with normal bowel sounds.  No distension or tympany.  No guarding or            

      rebound.  No evidence of tenderness throughout. Skin:  Warm, dry with normal turgor.        

      Normal color with no rashes, no lesions, and no evidence of cellulitis. MS/ Extremity:      

      Pulses equal, no cyanosis.  Neurovascular intact.  Full, normal range of motion.            

20:07 ECG was reviewed by the Attending Physician.                                                

                                                                                                  

Vital Signs:                                                                                      

18:36 Resp 17; Temp 99.0(TE); Weight 92.99 kg; Height 5 ft. 11 in. (180.34 cm);               ss  

18:40 Pulse 86; Pulse Ox 94% on R/A;                                                          ss  

18:42 BP 74 / 68;                                                                             ss  

18:42 BP 83 / 59;                                                                             ss  

18:48 BP 90 / 61; Pulse 72; Resp 16; Pulse Ox 95% on R/A;                                     hb  

20:00 BP 78 / 59; Pulse 71; Resp 19; Pulse Ox 95% on R/A;                                     jb4 

20:15 BP 91 / 67; Pulse 70; Resp 13; Pulse Ox 100% on R/A;                                    jb4 

20:15 BP 87 / 62; Pulse 70; Resp 16; Pulse Ox 97% on R/A;                                     jb4 

21:15 BP 87 / 62; Pulse 70; Resp 15; Pulse Ox 98% on R/A;                                     jb4 

22:00 BP 94 / 66; Pulse 65; Resp 17; Pulse Ox 98% on R/A;                                     jb4 

23:00  / 71; Pulse 80; Resp 16; Pulse Ox 99% on R/A;                                    jb4 

18:36 Body Mass Index 28.59 (92.99 kg, 180.34 cm)                                             ss  

20:00 physician notified                                                                      jb4 

                                                                                                  

MDM:                                                                                              

19:03 Patient medically screened.                                                             ms3 

20:10 Differential diagnosis: UTI vs PNA vs ICH.                                              ms3 

23:05 Data reviewed: vital signs, nurses notes, lab test result(s), EKG, radiologic studies,  ms3 

      and as a result, I will admit patient. Consideration of Admission/Observation Patient       

      was admitted/placed on observation. Management of patient was discussed with the            

      following: Hospitalist: Dr Clark. I considered the following discharge prescriptions or      

      medication management in the emergency department Medications were administered in the      

      Emergency Department. See MAR. Independent interpretation of the following test(s) in       

      the Emergency Department Cardiac monitor: rate is 74 beats/min, Rhythm is paced rhythm      

      with no ectopy, Interpretation: paced. Counseling: I had a detailed discussion with the     

      patient and/or guardian regarding: the historical points, exam findings, and any            

      diagnostic results supporting the discharge/admit diagnosis, lab results, radiology         

      results, the need for further work-up and treatment in the hospital. ED course: Case        

      discussed with Dr. Clark and he accepts patient as inpatient admission. Discussed plan       

      for admission with patient and he understands and agrees with plan. All questions were      

      answered. We will continue IV fluid administration as patient's blood pressure response     

      to IV fluids. Patient's daughter states patient has not been drinking as much lately..      

                                                                                                  

                                                                                             

18:51 Order name: Basic Metabolic Panel; Complete Time: 20:25                                 [facility] 

                                                                                             

18:51 Order name: CBC with Diff; Complete Time: 20:03                                         [facility] 

                                                                                             

18:51 Order name: LFT's; Complete Time: 20:25                                                 [facility] 

                                                                                             

18:51 Order name: Magnesium; Complete Time: 20:25                                             [facility] 

                                                                                             

18:51 Order name: Troponin HS; Complete Time: 20:25                                           [facility] 

                                                                                             

18:51 Order name: Urine Culture                                                               [facility] 

                                                                                             

18:51 Order name: COVID-19/FLU A+B; Complete Time: 21:33                                      [facility] 

                                                                                             

20:04 Order name: Blood Culture Adult (2)                                                     ms3 

                                                                                             

20:04 Order name: Lactate w/ 2H reflex if indic.; Complete Time: 21:33                        ms3 

                                                                                             

18:51 Order name: XRAY Chest (1 view); Complete Time: 20:03                                   [facility] 

                                                                                             

18:51 Order name: EKG; Complete Time: 18:52                                                   [facility] 

                                                                                             

18:51 Order name: CT Head Brain wo Cont; Complete Time: 20:03                                 [facility] 

                                                                                             

20:04 Order name: Protime (+inr); Complete Time: 21:33                                        ms3 

                                                                                             

20:04 Order name: Ptt, Activated; Complete Time: 21:33                                        ms3 

                                                                                             

22:34 Order name: Urine Dipstick-Ancillary; Complete Time: 22:46                              [facility]

                                                                                             

22:53 Order name: Basic Metabolic Panel                                                       [facility]

                                                                                             

22:53 Order name: Basic Metabolic Panel                                                       [facility]

                                                                                             

22:53 Order name: CBC with Automated Diff                                                     [facility]

                                                                                             

22:53 Order name: CBC with Automated Diff                                                     [facility]

                                                                                             

18:51 Order name: Cardiac monitoring; Complete Time: 20:08                                    [facility] 

                                                                                             

18:51 Order name: EKG - Nurse/Tech; Complete Time: 20:08                                      [facility] 

                                                                                             

18:51 Order name: IV Saline Lock; Complete Time: 19:51                                        [facility] 

                                                                                             

18:51 Order name: Labs collected and sent; Complete Time: 19:51                               [facility] 

                                                                                             

18:51 Order name: O2 Per Protocol; Complete Time: 19:51                                       [facility] 

                                                                                             

18:51 Order name: O2 Sat Monitoring; Complete Time: 19:51                                     [facility] 

                                                                                             

18:51 Order name: Urine Dipstick-Ancillary (obtain specimen); Complete Time: 22:46            [facility] 

                                                                                             

22:53 Order name: Heart Healthy                                                               [facility]

                                                                                                  

EC:07 Rate is 70 beats/min. Rhythm is regular. Left axis deviation noted. QRS interval is     ms3 

      prolonged. Clinical impression: Paced. Interpreted by me. Reviewed by me.                   

                                                                                                  

Administered Medications:                                                                         

19:23 Not Given (Physician Discretion): Rocephin (cefTRIAXone) 1 grams IV at per protocol     jb4 

      once; Given slow IV push per pharmacy instructions                                          

19:23 Not Given (Physician Discretion): NS 0.9% 1000 ml IV at 1 bolus Per protocol; 1000 mL   jb4 

      bolus                                                                                       

19:51 Drug: NS 0.9% 1000 ml Route: IV; Rate: 125 ml/hr; Site: right antecubital;              jb4 

20:07 Follow up: Rate change bolus; Changed per physicians instructions.                      jb4 

20:45 Follow up: Response: No adverse reaction; IV Status: Completed infusion; IV Intake:     jb4 

      1000ml                                                                                      

20:30 Drug: Rocephin (cefTRIAXone) 1 grams Route: IV; Rate: calculated rate; Site: right      jb4 

      antecubital;                                                                                

23:11 Follow up: Response: No adverse reaction; IV Status: Completed infusion                 jb4 

21:45 Drug: Lactated Ringers Solution 1000 ml Route: IV; Rate: 125 ml/hr; Site: right         jb4 

      antecubital;                                                                                

23:54 Follow up: IV Status: Infusion continued upon admission                                 jb4 

22:50 Drug: Lactated Ringers Solution 500 ml Route: IV; Rate: bolus; Site: right antecubital; jb4 

23:20 Follow up: Response: No adverse reaction; Marked relief of symptoms; IV Status:         jb4 

      Completed infusion; IV Intake: 500ml                                                        

                                                                                                  

                                                                                                  

Disposition Summary:                                                                              

23 22:23                                                                                    

Hospitalization Ordered                                                                           

      Hospitalization Status: Inpatient Admission                                             ms3 

      Provider: Mio Clark                                                                  ms3 

      [facility]                                                 ms3 

      Condition: Stable                                                                       ms3 

      Problem: new                                                                            ms3 

      Symptoms: are unchanged                                                                 ms3 

      Bed/Room Type: Standard                                                                 ms3 

      Room Assignment: 427(23 22:50)                                                    vc1 

      Diagnosis                                                                                   

        - Hypotension, unspecified                                                            ms3 

        - Altered mental status, unspecified                                                  ms3 

      Forms:                                                                                      

        - Medication Reconciliation Form                                                      ms3 

        - SBAR form                                                                           ms3 

Signatures:                                                                                       

Dispatcher [employer]                           EDMS                                                 

Preston Martinez MD MD cha Smirch, Shelby, RN                      RN   Ike Elam RN                       RN   jb4                                                  

Gerard Montaño DO                        DO   ms3                                                  

Malgorzata Tapia RN                    RN   vc1                                                  

Arabella Reyes PA-C                     PAMATEUSZ sb4                                                  

                                                                                                  

Corrections: (The following items were deleted from the chart)                                    

19:28 18:52 PROTIME (+INR)+COAG.LAB.BRZ ordered. EDMS                                         EDMS

19:28 18:52 BLOOD CULTURE*+BA.LAB.BRZ ordered. EDMS                                           EDMS

19:28 18:52 LACTATE+C.LAB.BRZ ordered. EDMS                                                   EDMS

19:30 18:51 PROBNP+C.LAB.BRZ ordered. EDMS                                                    EDMS

19:30 18:52 LIPASE+C.LAB.BRZ ordered. EDMS                                                    EDMS

22:50 22:23 ms3                                                                               vc1 

                                                                                                  

**************************************************************************************************

## 2023-02-12 VITALS — OXYGEN SATURATION: 92 %

## 2023-02-12 LAB
BUN BLD-MCNC: 29 MG/DL (ref 7–18)
GLUCOSE SERPLBLD-MCNC: 99 MG/DL (ref 74–106)
HCT VFR BLD CALC: 37.6 % (ref 39.6–49)
LYMPHOCYTES # SPEC AUTO: 2 K/UL (ref 0.7–4.9)
MCV RBC: 96.6 FL (ref 80–100)
PMV BLD: 7.9 FL (ref 7.6–11.3)
POTASSIUM SERPL-SCNC: 3.9 MMOL/L (ref 3.5–5.1)
RBC # BLD: 3.89 M/UL (ref 4.33–5.43)

## 2023-02-12 RX ADMIN — Medication SCH ML: at 09:04

## 2023-02-12 RX ADMIN — Medication SCH ML: at 22:06

## 2023-02-12 RX ADMIN — SODIUM CHLORIDE, SODIUM LACTATE, POTASSIUM CHLORIDE, AND CALCIUM CHLORIDE SCH MLS: .6; .31; .03; .02 INJECTION, SOLUTION INTRAVENOUS at 22:09

## 2023-02-12 RX ADMIN — LATANOPROST SCH DROP: 50 SOLUTION OPHTHALMIC at 09:10

## 2023-02-12 RX ADMIN — SODIUM CHLORIDE, SODIUM LACTATE, POTASSIUM CHLORIDE, AND CALCIUM CHLORIDE SCH MLS: .6; .31; .03; .02 INJECTION, SOLUTION INTRAVENOUS at 06:13

## 2023-02-12 RX ADMIN — ATORVASTATIN CALCIUM SCH MG: 40 TABLET, FILM COATED ORAL at 09:03

## 2023-02-12 RX ADMIN — RIVAROXABAN SCH MG: 15 TABLET, FILM COATED ORAL at 09:04

## 2023-02-12 RX ADMIN — SODIUM CHLORIDE, SODIUM LACTATE, POTASSIUM CHLORIDE, AND CALCIUM CHLORIDE SCH MLS: .6; .31; .03; .02 INJECTION, SOLUTION INTRAVENOUS at 11:25

## 2023-02-12 RX ADMIN — CEFTRIAXONE SCH MLS: 1 INJECTION, POWDER, FOR SOLUTION INTRAMUSCULAR; INTRAVENOUS at 09:03

## 2023-02-12 RX ADMIN — SODIUM CHLORIDE, SODIUM LACTATE, POTASSIUM CHLORIDE, AND CALCIUM CHLORIDE SCH MLS: .6; .31; .03; .02 INJECTION, SOLUTION INTRAVENOUS at 00:57

## 2023-02-12 RX ADMIN — CEFTRIAXONE SCH MLS: 1 INJECTION, POWDER, FOR SOLUTION INTRAMUSCULAR; INTRAVENOUS at 22:06

## 2023-02-12 NOTE — RAD REPORT
EXAM DESCRIPTION:  CT - Chest Abd Pelvis Wo Con - 2/12/2023 11:59 am

 

CLINICAL HISTORY:  Chest and abdomen pain.

leukocytosis

 

COMPARISON:  [facility] CHEST dated 1/20/2010

 

TECHNIQUE:  A limited noncontrast study was performed.

 

All CT scans are performed using dose optimization technique as appropriate and may include automated
 exposure control or mA/KV adjustment according to patient size.

 

FINDINGS:  The lungs are clear.Sternotomy wires. Pacemaker device is present.No pleural or pericardia
l effusion.No intrathoracic adenopathy.Cholecystectomy. IVC filter.

 

The liver, spleen, pancreas, adrenal glands and kidneys are within normal limits.

 

No bowel obstruction, free air, free fluid or abscess. Nonvisualized appendix. 3.8 cm infrarenal abdo
ramin aortic aneurysm. Both common iliac arteries are also ectatic. No pathologic lymphadenopathy in 
the abdomen or pelvis. Prostatectomy clips.

 

Mild lower lumbar degenerative changes.

 

IMPRESSION:  No acute finding is evident.

## 2023-02-13 VITALS — TEMPERATURE: 96.9 F

## 2023-02-13 VITALS — SYSTOLIC BLOOD PRESSURE: 180 MMHG | DIASTOLIC BLOOD PRESSURE: 87 MMHG

## 2023-02-13 LAB
ALBUMIN SERPL BCP-MCNC: 2.6 G/DL (ref 3.4–5)
ALP SERPL-CCNC: 40 U/L (ref 45–117)
ALT SERPL W P-5'-P-CCNC: 25 U/L (ref 16–61)
AST SERPL W P-5'-P-CCNC: 38 U/L (ref 15–37)
BUN BLD-MCNC: 29 MG/DL (ref 7–18)
GLUCOSE SERPLBLD-MCNC: 95 MG/DL (ref 74–106)
HCT VFR BLD CALC: 41.2 % (ref 39.6–49)
LYMPHOCYTES # SPEC AUTO: 1.9 K/UL (ref 0.7–4.9)
MAGNESIUM SERPL-MCNC: 1.9 MG/DL (ref 1.6–2.4)
MCV RBC: 96.3 FL (ref 80–100)
PMV BLD: 8.1 FL (ref 7.6–11.3)
POTASSIUM SERPL-SCNC: 4.1 MMOL/L (ref 3.5–5.1)
RBC # BLD: 4.28 M/UL (ref 4.33–5.43)

## 2023-02-13 RX ADMIN — LATANOPROST SCH DROP: 50 SOLUTION OPHTHALMIC at 08:14

## 2023-02-13 RX ADMIN — Medication SCH ML: at 08:16

## 2023-02-13 RX ADMIN — ATORVASTATIN CALCIUM SCH MG: 40 TABLET, FILM COATED ORAL at 08:15

## 2023-02-13 RX ADMIN — RIVAROXABAN SCH MG: 15 TABLET, FILM COATED ORAL at 08:15

## 2023-02-13 RX ADMIN — CEFTRIAXONE SCH MLS: 1 INJECTION, POWDER, FOR SOLUTION INTRAMUSCULAR; INTRAVENOUS at 08:15

## 2023-02-13 RX ADMIN — SODIUM CHLORIDE, SODIUM LACTATE, POTASSIUM CHLORIDE, AND CALCIUM CHLORIDE SCH: .6; .31; .03; .02 INJECTION, SOLUTION INTRAVENOUS at 05:28

## 2023-02-13 NOTE — DS
Date of Discharge:  02/13/2023



Disposition:  Discharged to go home.



Physical Examination:

HEENT:  Unremarkable. 

Lungs:  Clear to auscultation. 

Heart:  Sounds normal. 

Abdomen:  Soft.  Bowel sounds normal.  No guarding, rigidity, tenderness, distention. 

Extremities:  No leg edema.



Laboratory Data:  Upon admission, white count 15.6, hemoglobin 14.6, and platelet count of 152.  Toda
y, white count 8.5, hemoglobin 13.9, platelets 156.  Upon admission, sodium 140, potassium 3.9, chlor
odalys 108, bicarb 26, BUN 29, creatinine 2.03.  Today, sodium 140, potassium 4.1, chloride 110, bicarb 
27, BUN 29, creatinine 1.57, glucose 95.  Blood culture remained negative.  Chest x-ray:  No acute ca
rdiopulmonary changes.  CAT scan of the head:  No acute intracranial changes.  CAT scan of the chest,
 abdomen, pelvis without contrast was negative for any acute findings.



Discharge Medications And Instructions:  

1.Continue all prior home medications.

2.Follow up at my office next week.

3.Take cefuroxime 250 mg 2 times a day for 1 week.



Hospital Course:  An 88-year-old pleasant male patient admitted to the hospital after he came into em
ergency room.  Please see dictated H and P for more information.  Patient had elevated white count an
d he was admitted to the hospital.  Empiric antibiotic, ceftriaxone, was started.  Patient was having
 left ear pain and in the emergency room, he was noted to have ear wax buildup in the left ear and to
day he was still complaining of pain in his left ear.  That was in fact his only complaint, so we did
 request consultation from his ENT specialist, Dr. Olivia, who came by and evaluated the patient and
 removed the wax from his left ear and suggested the patient to continue to follow with him if he con
tinues to have any ear pain.  He also suggested for him to follow up with the dentist.  Patient's cul
ture remain negative him.  His WBC count has improved with empiric antibiotic and we will continue th
is empiric antibiotic on outpatient basis.



Final Diagnoses:  

1.Leukocytosis.

2.Left ear wax.

3.Volume depletion.





AMEE/MODL

DD:  02/13/2023 20:08:32Voice ID:  [id number]

DT:  02/13/2023 20:40:15Report ID:  [id number]

## 2023-02-13 NOTE — P.CNS
Date of Consult: 02/13/23


Consult requested for ear pain.  Admitted for AMS.





HPI:  Patient known to me from outpatient clinic with history of skin cancer, 

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  The patient reports that he began having

left-sided ear and head pain approximately 2 days ago.  On Saturday his pain was

aching but not shooting or stabbing.  The pain seems to be intermittent and was 

worse last night during the night and not relieved by positional changes.  No 

subjective hearing loss.  No recent dental visit.  No Q-tip use.  No bleeding or

drainage from the ear.  No prior surgery on the middle ear or ear canal.  No 

rash noted of the face or head.





PMH:  AFib, HTN, Enrolled with cardiac rehab. 





All: Ambien





Current medication list reviewed.





SH:  No tobacco or EtOH





PE: Patient is in no acute distress.  He is alert and oriented.  The left ear is

occluded with cerumen which is removed under a headlight using a wire loop.  

After removal, the ear canal appears clear, the eardrum appears intact with no 

significant redness or bulging.  The patient does not have significant temporal 

tenderness.  The area of the scalp above and behind the ear does not appear to 

have any rash, blisters or other lesions.  The face is symmetric.  Pupils are 

equal round and reactive, extraocular movements are intact.  There are a number 

of well-healed scars across the patient's face from prior skin cancer excisions.

 There is no obvious external lesions suggestive of recurrent or new skin 

cancers of the scalp, face, or neck.  There are a number of support keratosis 

which are otherwise unremarkable.  The patient's TMJ, mandible, and neck are not

tender.  His oral exam demonstrates fair dental condition with multiple 

fillings.  There are no obvious mucosal lesions of the buccal mucosa, gingiva, 

hard or soft palate.  The floor of mouth is unremarkable.  The oral tongue is 

unremarkable.  There are no obvious asymmetries of the oral pharynx.  There are 

no palpable masses of the base of tongue.  The left coronoid process is mildly 

tender to palpation compared to the right side





Data:  Imaging including CT head reviewed - L EAC partially occluded with 

presumed cerumen.  B middle ear WNL, mastoids WNL.  Paranasal sinuses without 

any edema/mucosal swelling or opacifcation.  WBC elevated on admission on 2/11 

but now normalized.  UCx pending.  UA with some ketones and protein.  Cr 

elevated.





Assessment: Cerumen impaction, left and otalgia, left.





Plan: Cerumen removal via headlight at the bedside.  It is unclear whether the 

cerumen may be an exacerbating factor of his ear pain.  In some cases a bit of 

hard cerumen can touch and/or put pressure on the ear canal or the eardrum 

causing discomfort.  I recommend the patient be discharged according to the 

primary team's plan of care.  If the patient continues to have ear pain, I 

recommend he follow-up with his dentist to evaluate for a dental cause of 

referred left ear pain.  If no causes identified and symptoms persist, I would 

be happy to evaluate the patient further in the outpatient clinic.  If the 

patient's symptoms are resolved he can follow-up on an as-needed basis

## 2023-02-13 NOTE — HP
Date of Admission:  02/11/2023



Chief Complaint:  Altered mental status and chills.



History Of Present Illness:  This is an 88-year-old very pleasant male patient, who was doing fine in
 his normal usual state of health until yesterday evening time.  He started to have some chills and d
id not feel good at all, so the patient had to get in the bed for a while and when he woke up off aft
er a while, he was confused and was subsequently brought into the emergency room.  He denies any coug
h, cold, congestion.  No headache.  He has been having some left ear discomfort in last few days.  No
 sore throat.  He is not coughing up any colored mucus.  Denies any abdominal pain, nausea, vomiting,
 constipation, or diarrhea.  Denies any urinary complaints.  After he arrived to emergency room, ER oscar root evaluated him, was noted to have some wax in his left ear.  His initial blood pressure was 8
3/59, and after appropriate workup was done, the patient was admitted to the hospital.  IV fluid and 
empiric antibiotic were started and I was contacted for this admission.  This morning when I saw him,
 he was in the room.  His daughter was with him at bedside and the patient overall was feeling better
 than yesterday.  The patient's confusion has resolved and he is answering questions appropriately.  
The patient reports that in last few months he has been having night sweats.



Medications:  List reviewed.



Allergies:  TO AMBIEN CAUSING HALLUCINATION.



Review of Systems:

Constitutional:  As mentioned above. 

CNS:  As mentioned above. 



All other systems reviewed and negative.



Past Medical History:  Significant for prior history of stroke, allergic rhinitis, mild persistent as
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
 artery disease, paroxysmal atrial fibrillation, sick sinus syndrome, prostate cancer, chronic kidney
 disease stage 3B.



Past Surgical History:  Coronary artery bypass surgery in 2006, IVC filter placement in 2006, cholecy
stectomy, appendectomy, prostatectomy in 1993, and pacemaker placement last year in 2022.



Family History:  Not pertinent.



Social History:  Negative for smoking and alcohol use.



Physical Examination:

Vital Signs:  Initial temperature when he first arrived to emergency room was 99, pulse 86, respirato
ry rate 17, blood pressure 83/59, oxygen saturation 94%.  Height 5 feet 10 inches, weight 205 pounds.
 

General:  Awake, alert, oriented, not in distress. 

HEENT:  Head atraumatic, normocephalic.  Conjunctivae nonerythematous.  Sclerae white.  Mouth, no thr
ush or edema noted.  Ears/Nose, no mass, lesion, discharge noted. 

Neck:  Supple. No JVD, lymph nodes, bruit, thyromegaly noted. 

Lungs:  Bilateral good equal air entry. Clear to auscultation. No rhonchi.  No rales. 

Heart:  Normal heart sounds, no murmur or gallop. 

Abdomen:  Soft, bowel sounds normal. No guarding, rigidity, tenderness, mass, hepatosplenomegaly, dis
tention, or bruit noted. 

Extremities:  No leg edema.  No calf tenderness. 

Skin:  No rash, ulcer, cellulitis. 

Lymphatics:  No lymph node enlargement in neck, supraclavicular, infraclavicular region. 

Neuro:  No focal neurological deficit. 

Chest:  Unremarkable. 

External Genitalia:  Deferred. 

Rectal:  Deferred.



Laboratory Data:  Yesterday; white count 15.6, hemoglobin 14.6 platelets 152.  This morning; white co
unt 12.4, hemoglobin 12.7, platelets 140.  Yesterday; sodium 140, potassium 3.9, chloride 108, bicarb
 26, BUN 29, creatinine 2.03, glucose 116, total bilirubin 2.1, direct bilirubin 0.3, AST 26, ALT 28,
 alkaline phosphatase 47.  Troponin 33.  Today; sodium 141, potassium 3.9, chloride 110, bicarb 27, B
UN 29, creatinine 1.93, glucose 99.  Urinalysis; trace protein and trace ketones, otherwise negative.
  Influenza A, B, and COVID-19 test negative.  Chest x-ray, no acute cardiopulmonary changes.  CAT sc
an of the head, no acute intracranial changes.



Impression:  

1.Rule out sepsis.

2.Coronary artery disease.

3.Hypertension.

4.Hyperlipidemia.

5.Paroxysmal atrial fibrillation.

6.Chronic kidney disease, stage 3B.

7.Mild persistent asthma.

8.Prostate cancer.



Plan:  We will go ahead and admit the patient to hospital for further evaluation and management of th
is problem.  The patient is appropriate for inpatient and is expected to spend 2 midnights in hospita
l.  We will continue IV fluid, but reduce rate from 125 cc down to 100 cc per hour.  We will repeat b
lood work tomorrow morning.  Follow up on culture results and if necessary, make adjustment on antibi
otics.  Currently, he is on ceftriaxone and we will continue that.  I will go ahead and continue his 
cholesterol medication for hyperlipidemia.  We will continue his Xarelto 15 mg daily as he takes it a
t home for his chronic anticoagulation for atrial fibrillation problem.  We will not give any antihyp
ertensive medication in view of current blood pressure and monitor that and at appropriate time, we w
ill consider to restart antihypertensive medications.  I will go ahead and get a CAT scan of the ches
t, abdomen, and pelvis without contrast for further workup, and details and plan of treatment discuss
ed with the patient and his daughter who was at bedside.  I will see him in the morning.





AMEE/JORDAN

DD:  02/12/2023 09:53:39Voice ID:  [id number]

## 2023-02-14 NOTE — EKG
Test Date:    2023-02-11               Test Time:    20:02:08

Technician:   CHANDRA                                    

                                                     

MEASUREMENT RESULTS:                                       

Intervals:                                           

Rate:         70                                     

VT:                                                  

QRSD:         160                                    

QT:           436                                    

QTc:          470                                    

Axis:                                                

P:                                                   

VT:                                                  

QRS:          -77                                    

T:            90                                     

                                                     

INTERPRETIVE STATEMENTS:                                       

                                                     

Demand pacemaker, interpretation is based on intrinsic rhythm

Wide QRS rhythm with fusion complexes

Left axis deviation

Nonspecific intraventricular block

Possible Lateral infarct, age undetermined

Inferior infarct, age undetermined

Abnormal ECG





Electronically Signed On 02-14-23 17:14:38 CST by Brett Dow